# Patient Record
Sex: FEMALE | Race: WHITE | NOT HISPANIC OR LATINO | Employment: OTHER | URBAN - METROPOLITAN AREA
[De-identification: names, ages, dates, MRNs, and addresses within clinical notes are randomized per-mention and may not be internally consistent; named-entity substitution may affect disease eponyms.]

---

## 2023-03-14 LAB — FECAL OCCULT BLD IMMUNOASSAY: NEGATIVE

## 2023-03-17 ENCOUNTER — TELEPHONE (OUTPATIENT)
Dept: PRIMARY CARE | Facility: CLINIC | Age: 73
End: 2023-03-17
Payer: MEDICARE

## 2023-03-17 NOTE — TELEPHONE ENCOUNTER
----- Message from AMRITA Jeffery sent at 3/17/2023  3:01 AM EDT -----    ----- Message -----  From: Miguelito Bucio - Lab Results In  Sent: 3/14/2023   6:19 PM EDT  To: AMRITA Jeffery

## 2023-04-03 ENCOUNTER — TELEPHONE (OUTPATIENT)
Dept: PRIMARY CARE | Facility: CLINIC | Age: 73
End: 2023-04-03
Payer: MEDICARE

## 2023-04-03 RX ORDER — ATORVASTATIN CALCIUM 10 MG/1
1 TABLET, FILM COATED ORAL DAILY
COMMUNITY
Start: 2017-03-03 | End: 2023-04-04 | Stop reason: SDUPTHER

## 2023-04-03 RX ORDER — METOPROLOL TARTRATE 25 MG/1
TABLET, FILM COATED ORAL EVERY 12 HOURS
COMMUNITY
End: 2023-04-04 | Stop reason: SDUPTHER

## 2023-04-03 NOTE — TELEPHONE ENCOUNTER
Patient is requesting a refill on [facility]rol[facility]

## 2023-04-04 DIAGNOSIS — E78.2 MIXED HYPERLIPIDEMIA: Primary | ICD-10-CM

## 2023-04-04 DIAGNOSIS — I10 PRIMARY HYPERTENSION: ICD-10-CM

## 2023-04-04 RX ORDER — ATORVASTATIN CALCIUM 10 MG/1
10 TABLET, FILM COATED ORAL DAILY
Qty: 90 TABLET | Refills: 3 | Status: SHIPPED | OUTPATIENT
Start: 2023-04-04 | End: 2024-04-05 | Stop reason: SDUPTHER

## 2023-04-04 RX ORDER — METOPROLOL TARTRATE 25 MG/1
25 TABLET, FILM COATED ORAL 2 TIMES DAILY
Qty: 180 TABLET | Refills: 3 | Status: SHIPPED | OUTPATIENT
Start: 2023-04-04 | End: 2024-04-05 | Stop reason: SDUPTHER

## 2023-08-25 LAB
ALANINE AMINOTRANSFERASE (SGPT) (U/L) IN SER/PLAS: 26 U/L (ref 7–45)
ALBUMIN (G/DL) IN SER/PLAS: 4.2 G/DL (ref 3.4–5)
ALKALINE PHOSPHATASE (U/L) IN SER/PLAS: 62 U/L (ref 33–136)
ANION GAP IN SER/PLAS: 12 MMOL/L (ref 10–20)
ASPARTATE AMINOTRANSFERASE (SGOT) (U/L) IN SER/PLAS: 18 U/L (ref 9–39)
BASOPHILS (10*3/UL) IN BLOOD BY AUTOMATED COUNT: 0.03 X10E9/L (ref 0–0.1)
BASOPHILS/100 LEUKOCYTES IN BLOOD BY AUTOMATED COUNT: 0.7 % (ref 0–2)
BILIRUBIN TOTAL (MG/DL) IN SER/PLAS: 0.5 MG/DL (ref 0–1.2)
CALCIDIOL (25 OH VITAMIN D3) (NG/ML) IN SER/PLAS: 38 NG/ML
CALCIUM (MG/DL) IN SER/PLAS: 9.1 MG/DL (ref 8.6–10.3)
CARBON DIOXIDE, TOTAL (MMOL/L) IN SER/PLAS: 28 MMOL/L (ref 21–32)
CHLORIDE (MMOL/L) IN SER/PLAS: 107 MMOL/L (ref 98–107)
CHOLESTEROL (MG/DL) IN SER/PLAS: 158 MG/DL (ref 0–199)
CHOLESTEROL IN HDL (MG/DL) IN SER/PLAS: 46.6 MG/DL
CHOLESTEROL/HDL RATIO: 3.4
CREATININE (MG/DL) IN SER/PLAS: 0.67 MG/DL (ref 0.5–1.05)
EOSINOPHILS (10*3/UL) IN BLOOD BY AUTOMATED COUNT: 0.1 X10E9/L (ref 0–0.4)
EOSINOPHILS/100 LEUKOCYTES IN BLOOD BY AUTOMATED COUNT: 2.2 % (ref 0–6)
ERYTHROCYTE DISTRIBUTION WIDTH (RATIO) BY AUTOMATED COUNT: 13 % (ref 11.5–14.5)
ERYTHROCYTE MEAN CORPUSCULAR HEMOGLOBIN CONCENTRATION (G/DL) BY AUTOMATED: 33.3 G/DL (ref 32–36)
ERYTHROCYTE MEAN CORPUSCULAR VOLUME (FL) BY AUTOMATED COUNT: 88 FL (ref 80–100)
ERYTHROCYTES (10*6/UL) IN BLOOD BY AUTOMATED COUNT: 4.81 X10E12/L (ref 4–5.2)
GFR FEMALE: >90 ML/MIN/1.73M2
GLUCOSE (MG/DL) IN SER/PLAS: 113 MG/DL (ref 74–99)
HEMATOCRIT (%) IN BLOOD BY AUTOMATED COUNT: 42.3 % (ref 36–46)
HEMOGLOBIN (G/DL) IN BLOOD: 14.1 G/DL (ref 12–16)
IMMATURE GRANULOCYTES/100 LEUKOCYTES IN BLOOD BY AUTOMATED COUNT: 0 % (ref 0–0.9)
LDL: 83 MG/DL (ref 0–99)
LEUKOCYTES (10*3/UL) IN BLOOD BY AUTOMATED COUNT: 4.5 X10E9/L (ref 4.4–11.3)
LYMPHOCYTES (10*3/UL) IN BLOOD BY AUTOMATED COUNT: 1.93 X10E9/L (ref 0.8–3)
LYMPHOCYTES/100 LEUKOCYTES IN BLOOD BY AUTOMATED COUNT: 43.4 % (ref 13–44)
MONOCYTES (10*3/UL) IN BLOOD BY AUTOMATED COUNT: 0.46 X10E9/L (ref 0.05–0.8)
MONOCYTES/100 LEUKOCYTES IN BLOOD BY AUTOMATED COUNT: 10.3 % (ref 2–10)
NEUTROPHILS (10*3/UL) IN BLOOD BY AUTOMATED COUNT: 1.93 X10E9/L (ref 1.6–5.5)
NEUTROPHILS/100 LEUKOCYTES IN BLOOD BY AUTOMATED COUNT: 43.4 % (ref 40–80)
PLATELETS (10*3/UL) IN BLOOD AUTOMATED COUNT: 249 X10E9/L (ref 150–450)
POTASSIUM (MMOL/L) IN SER/PLAS: 4.1 MMOL/L (ref 3.5–5.3)
PROTEIN TOTAL: 6.5 G/DL (ref 6.4–8.2)
SODIUM (MMOL/L) IN SER/PLAS: 143 MMOL/L (ref 136–145)
THYROTROPIN (MIU/L) IN SER/PLAS BY DETECTION LIMIT <= 0.05 MIU/L: 2.39 MIU/L (ref 0.44–3.98)
TRIGLYCERIDE (MG/DL) IN SER/PLAS: 144 MG/DL (ref 0–149)
UREA NITROGEN (MG/DL) IN SER/PLAS: 18 MG/DL (ref 6–23)
VLDL: 29 MG/DL (ref 0–40)

## 2023-08-26 DIAGNOSIS — R73.09 ELEVATED GLUCOSE: Primary | ICD-10-CM

## 2023-08-26 LAB — PARATHYRIN INTACT (PG/ML) IN SER/PLAS: 51.8 PG/ML (ref 18.5–88)

## 2023-08-29 ENCOUNTER — OFFICE VISIT (OUTPATIENT)
Dept: PRIMARY CARE | Facility: CLINIC | Age: 73
End: 2023-08-29
Payer: MEDICARE

## 2023-08-29 VITALS
WEIGHT: 158 LBS | TEMPERATURE: 97.2 F | DIASTOLIC BLOOD PRESSURE: 72 MMHG | BODY MASS INDEX: 29.08 KG/M2 | SYSTOLIC BLOOD PRESSURE: 132 MMHG | HEIGHT: 62 IN

## 2023-08-29 DIAGNOSIS — I10 BENIGN ESSENTIAL HYPERTENSION: ICD-10-CM

## 2023-08-29 DIAGNOSIS — R73.03 PREDIABETES: Primary | ICD-10-CM

## 2023-08-29 DIAGNOSIS — E78.2 MIXED HYPERLIPIDEMIA: ICD-10-CM

## 2023-08-29 DIAGNOSIS — E89.2 STATUS POST SUBTOTAL PARATHYROIDECTOMY (CMS/HCC): ICD-10-CM

## 2023-08-29 PROBLEM — E78.5 HYPERLIPIDEMIA: Status: ACTIVE | Noted: 2023-08-29

## 2023-08-29 PROBLEM — M54.12 CERVICAL RADICULOPATHY, CHRONIC: Status: ACTIVE | Noted: 2023-08-29

## 2023-08-29 PROBLEM — H43.23 ASTEROID HYALOSIS OF BOTH EYES: Status: ACTIVE | Noted: 2023-08-29

## 2023-08-29 PROBLEM — H43.393 VITREOUS FLOATERS OF BOTH EYES: Status: ACTIVE | Noted: 2023-08-29

## 2023-08-29 PROBLEM — H52.13 MYOPIA OF BOTH EYES WITH ASTIGMATISM AND PRESBYOPIA: Status: ACTIVE | Noted: 2023-08-29

## 2023-08-29 PROBLEM — H43.812 PVD (POSTERIOR VITREOUS DETACHMENT), LEFT EYE: Status: ACTIVE | Noted: 2023-08-29

## 2023-08-29 PROBLEM — D31.32 CHOROIDAL NEVUS OF LEFT EYE: Status: ACTIVE | Noted: 2023-08-29

## 2023-08-29 PROBLEM — H40.1190 PRIMARY OPEN ANGLE GLAUCOMA (POAG): Status: ACTIVE | Noted: 2023-08-29

## 2023-08-29 PROBLEM — H40.2291: Status: ACTIVE | Noted: 2023-08-29

## 2023-08-29 PROBLEM — H34.8310 BRANCH RETINAL VEIN OCCLUSION OF RIGHT EYE WITH MACULAR EDEMA (CMS-HCC): Status: ACTIVE | Noted: 2023-08-29

## 2023-08-29 PROBLEM — Z96.1 PSEUDOPHAKIA OF LEFT EYE: Status: ACTIVE | Noted: 2023-08-29

## 2023-08-29 PROBLEM — H59.032 CYSTOID MACULAR EDEMA FOLLOWING CATARACT SURGERY, LEFT EYE: Status: ACTIVE | Noted: 2023-08-29

## 2023-08-29 PROBLEM — H43.22 ASTEROID HYALOSIS OF LEFT EYE: Status: ACTIVE | Noted: 2023-08-29

## 2023-08-29 PROBLEM — H52.10 MYOPIA: Status: ACTIVE | Noted: 2023-08-29

## 2023-08-29 PROBLEM — H52.4 MYOPIA OF BOTH EYES WITH ASTIGMATISM AND PRESBYOPIA: Status: ACTIVE | Noted: 2023-08-29

## 2023-08-29 PROBLEM — H52.209 ASTIGMATISM: Status: ACTIVE | Noted: 2023-08-29

## 2023-08-29 PROBLEM — E55.9 VITAMIN D DEFICIENCY DISEASE: Status: ACTIVE | Noted: 2023-08-29

## 2023-08-29 PROBLEM — E03.9 PRIMARY HYPOTHYROIDISM: Status: ACTIVE | Noted: 2023-08-29

## 2023-08-29 PROBLEM — I25.10 CAD (CORONARY ARTERY DISEASE): Status: ACTIVE | Noted: 2023-08-29

## 2023-08-29 PROBLEM — H43.822 VITREOMACULAR TRACTION SYNDROME OF LEFT EYE: Status: ACTIVE | Noted: 2023-08-29

## 2023-08-29 PROBLEM — R20.2 PARESTHESIA OF BOTH HANDS: Status: ACTIVE | Noted: 2023-08-29

## 2023-08-29 PROBLEM — H25.811 COMBINED FORM OF AGE-RELATED CATARACT, RIGHT EYE: Status: ACTIVE | Noted: 2023-08-29

## 2023-08-29 PROBLEM — U07.1 COVID-19 VIRUS INFECTION: Status: ACTIVE | Noted: 2023-08-29

## 2023-08-29 PROBLEM — H93.13 TINNITUS OF BOTH EARS: Status: ACTIVE | Noted: 2023-08-29

## 2023-08-29 PROBLEM — D72.829 LEUCOCYTOSIS: Status: ACTIVE | Noted: 2023-08-29

## 2023-08-29 PROBLEM — M54.2 NECK PAIN: Status: ACTIVE | Noted: 2023-08-29

## 2023-08-29 PROBLEM — R91.1 LUNG NODULE: Status: ACTIVE | Noted: 2023-08-29

## 2023-08-29 PROBLEM — H52.203 MYOPIA OF BOTH EYES WITH ASTIGMATISM AND PRESBYOPIA: Status: ACTIVE | Noted: 2023-08-29

## 2023-08-29 PROBLEM — R00.2 PALPITATIONS: Status: ACTIVE | Noted: 2023-08-29

## 2023-08-29 PROBLEM — H25.13 CATARACT, NUCLEAR SCLEROTIC, BOTH EYES: Status: ACTIVE | Noted: 2023-08-29

## 2023-08-29 PROBLEM — H26.9 CATARACT, CORTICAL, BOTH EYES: Status: ACTIVE | Noted: 2023-08-29

## 2023-08-29 PROBLEM — M85.80 OSTEOPENIA: Status: ACTIVE | Noted: 2023-08-29

## 2023-08-29 LAB — POC HEMOGLOBIN A1C: 5.9 % (ref 4.2–6.5)

## 2023-08-29 PROCEDURE — 1157F ADVNC CARE PLAN IN RCRD: CPT | Performed by: NURSE PRACTITIONER

## 2023-08-29 PROCEDURE — 83036 HEMOGLOBIN GLYCOSYLATED A1C: CPT | Performed by: NURSE PRACTITIONER

## 2023-08-29 PROCEDURE — 1036F TOBACCO NON-USER: CPT | Performed by: NURSE PRACTITIONER

## 2023-08-29 PROCEDURE — 99214 OFFICE O/P EST MOD 30 MIN: CPT | Performed by: NURSE PRACTITIONER

## 2023-08-29 PROCEDURE — 3078F DIAST BP <80 MM HG: CPT | Performed by: NURSE PRACTITIONER

## 2023-08-29 PROCEDURE — 1160F RVW MEDS BY RX/DR IN RCRD: CPT | Performed by: NURSE PRACTITIONER

## 2023-08-29 PROCEDURE — 3075F SYST BP GE 130 - 139MM HG: CPT | Performed by: NURSE PRACTITIONER

## 2023-08-29 PROCEDURE — 1159F MED LIST DOCD IN RCRD: CPT | Performed by: NURSE PRACTITIONER

## 2023-08-29 RX ORDER — BRIMONIDINE TARTRATE 2 MG/ML
1 SOLUTION/ DROPS OPHTHALMIC
COMMUNITY
Start: 2022-10-28 | End: 2024-01-18 | Stop reason: SDUPTHER

## 2023-08-29 RX ORDER — LATANOPROST 50 UG/ML
1 SOLUTION/ DROPS OPHTHALMIC DAILY
COMMUNITY
Start: 2023-08-26 | End: 2024-01-10 | Stop reason: SDUPTHER

## 2023-08-29 RX ORDER — ACETAMINOPHEN 500 MG
1 TABLET ORAL DAILY
COMMUNITY

## 2023-08-29 RX ORDER — VIT C/E/ZN/COPPR/LUTEIN/ZEAXAN 250MG-90MG
CAPSULE ORAL 2 TIMES DAILY
COMMUNITY

## 2023-08-29 RX ORDER — TALC
1 POWDER (GRAM) TOPICAL DAILY
COMMUNITY

## 2023-08-29 RX ORDER — TIMOLOL MALEATE 5 MG/ML
1 SOLUTION/ DROPS OPHTHALMIC DAILY
COMMUNITY
Start: 2022-05-04 | End: 2023-10-30

## 2023-08-29 RX ORDER — GLUCOSAMINE/CHONDR SU A SOD 167-133 MG
1 CAPSULE ORAL DAILY
COMMUNITY

## 2023-08-29 RX ORDER — CALCIUM CARBONATE/VITAMIN D3 600MG-5MCG
1 TABLET ORAL DAILY
COMMUNITY

## 2023-08-29 ASSESSMENT — PATIENT HEALTH QUESTIONNAIRE - PHQ9
2. FEELING DOWN, DEPRESSED OR HOPELESS: NOT AT ALL
SUM OF ALL RESPONSES TO PHQ9 QUESTIONS 1 AND 2: 0
1. LITTLE INTEREST OR PLEASURE IN DOING THINGS: NOT AT ALL

## 2023-08-29 NOTE — PROGRESS NOTES
Subjective   Patient ID: Es Ivey is a 73 y.o. female who presents for Follow-up (Review labs).       Recent Results (from the past 1008 hour(s))   Vitamin D, Total    Collection Time: 08/25/23 10:01 AM   Result Value Ref Range    Vitamin D, 25-Hydroxy 38 ng/mL   Comprehensive Metabolic Panel    Collection Time: 08/25/23 10:01 AM   Result Value Ref Range    Glucose 113 (H) 74 - 99 mg/dL    Sodium 143 136 - 145 mmol/L    Potassium 4.1 3.5 - 5.3 mmol/L    Chloride 107 98 - 107 mmol/L    Bicarbonate 28 21 - 32 mmol/L    Anion Gap 12 10 - 20 mmol/L    Urea Nitrogen 18 6 - 23 mg/dL    Creatinine 0.67 0.50 - 1.05 mg/dL    GFR Female >90 >90 mL/min/1.73m2    Calcium 9.1 8.6 - 10.3 mg/dL    Albumin 4.2 3.4 - 5.0 g/dL    Alkaline Phosphatase 62 33 - 136 U/L    Total Protein 6.5 6.4 - 8.2 g/dL    AST 18 9 - 39 U/L    Total Bilirubin 0.5 0.0 - 1.2 mg/dL    ALT (SGPT) 26 7 - 45 U/L   CBC and Auto Differential    Collection Time: 08/25/23 10:01 AM   Result Value Ref Range    WBC 4.5 4.4 - 11.3 x10E9/L    RBC 4.81 4.00 - 5.20 x10E12/L    Hemoglobin 14.1 12.0 - 16.0 g/dL    Hematocrit 42.3 36.0 - 46.0 %    MCV 88 80 - 100 fL    MCHC 33.3 32.0 - 36.0 g/dL    Platelets 249 150 - 450 x10E9/L    RDW 13.0 11.5 - 14.5 %    Neutrophils % 43.4 40.0 - 80.0 %    Immature Granulocytes %, Automated 0.0 0.0 - 0.9 %    Lymphocytes % 43.4 13.0 - 44.0 %    Monocytes % 10.3 2.0 - 10.0 %    Eosinophils % 2.2 0.0 - 6.0 %    Basophils % 0.7 0.0 - 2.0 %    Neutrophils Absolute 1.93 1.60 - 5.50 x10E9/L    Lymphocytes Absolute 1.93 0.80 - 3.00 x10E9/L    Monocytes Absolute 0.46 0.05 - 0.80 x10E9/L    Eosinophils Absolute 0.10 0.00 - 0.40 x10E9/L    Basophils Absolute 0.03 0.00 - 0.10 x10E9/L   TSH with reflex to Free T4 if abnormal    Collection Time: 08/25/23 10:01 AM   Result Value Ref Range    TSH 2.39 0.44 - 3.98 mIU/L   Lipid Panel    Collection Time: 08/25/23 10:01 AM   Result Value Ref Range    Cholesterol 158 0 - 199 mg/dL    HDL 46.6 mg/dL  "   Cholesterol/HDL Ratio 3.4     LDL 83 0 - 99 mg/dL    VLDL 29 0 - 40 mg/dL    Triglycerides 144 0 - 149 mg/dL   Parathyroid Hormone, Intact    Collection Time: 08/25/23 10:01 AM   Result Value Ref Range    PTH 51.8 18.5 - 88.0 pg/mL           HPI    Nightmares stopped and no additional trouble sleeping.  Some trouble getting back to sleep at about 5 when up to the bathroom.  Walking regularly:  2 miles briskly 3-4 times per week  Bored when doing this.  2 miles takes about 21-22 minutes per mile.  No issues with musculoskeletal issues.    Neck is a problem  Had to stop cycling due to symptoms in hands  Once per week, does picnic lunch in park    Dr. Hunter, Endocrine once per year  Then only going at the time of the bone density.    Would prefer not to go.    Feeling good day to day.  Denies any side effects to medications.  Checking BP at home - see list.  Generally to goal.    Review of Systems   All other systems reviewed and are negative.    Objective   /72   Temp 36.2 °C (97.2 °F)   Ht 1.568 m (5' 1.75\")   Wt 71.7 kg (158 lb)   BMI 29.13 kg/m²     Physical Exam  Vitals and nursing note reviewed.   Constitutional:       Appearance: Normal appearance.   HENT:      Head: Normocephalic and atraumatic.      Right Ear: Tympanic membrane, ear canal and external ear normal.      Left Ear: Tympanic membrane and ear canal normal.   Neck:      Thyroid: No thyroid mass, thyromegaly or thyroid tenderness.   Cardiovascular:      Rate and Rhythm: Normal rate and regular rhythm.      Pulses: Normal pulses.      Heart sounds: Normal heart sounds.   Pulmonary:      Effort: Pulmonary effort is normal.      Breath sounds: Normal breath sounds.   Abdominal:      General: Bowel sounds are normal.      Palpations: Abdomen is soft.   Skin:     General: Skin is warm.   Neurological:      Mental Status: She is alert and oriented to person, place, and time. Mental status is at baseline.   Psychiatric:         Mood and Affect: " Mood normal.         Behavior: Behavior normal.         Assessment/Plan   Problem List Items Addressed This Visit       Benign essential hypertension    Hyperlipidemia    Prediabetes - Primary    Relevant Orders    Hemoglobin A1C    Glucose, fasting    POCT glycosylated hemoglobin (Hb A1C) manually resulted (Completed)    Status post subtotal parathyroidectomy (CMS/[facility])     Talked about diet and activity  Pt to cut back on refined sugars       Wanda Mistry, GROVER-CNP

## 2023-08-29 NOTE — PATIENT INSTRUCTIONS
Good to see you today.    The numbers look good.  Plan to follow up with fasting labs before visit in March.  Let me know if you need anything.

## 2023-08-30 PROBLEM — R73.03 PREDIABETES: Status: ACTIVE | Noted: 2023-08-30

## 2023-09-27 ENCOUNTER — TELEPHONE (OUTPATIENT)
Dept: PRIMARY CARE | Facility: CLINIC | Age: 73
End: 2023-09-27
Payer: MEDICARE

## 2023-10-30 DIAGNOSIS — H40.1132 PRIMARY OPEN ANGLE GLAUCOMA (POAG) OF BOTH EYES, MODERATE STAGE: Primary | ICD-10-CM

## 2023-10-30 RX ORDER — TIMOLOL MALEATE 5 MG/ML
1 SOLUTION/ DROPS OPHTHALMIC DAILY
Qty: 30 ML | Refills: 0 | Status: SHIPPED | OUTPATIENT
Start: 2023-10-30 | End: 2024-01-18 | Stop reason: SDUPTHER

## 2023-12-21 ENCOUNTER — APPOINTMENT (OUTPATIENT)
Dept: OPHTHALMOLOGY | Facility: CLINIC | Age: 73
End: 2023-12-21
Payer: MEDICARE

## 2024-01-10 DIAGNOSIS — H40.1132 PRIMARY OPEN-ANGLE GLAUCOMA, BILATERAL, MODERATE STAGE: Primary | ICD-10-CM

## 2024-01-10 DIAGNOSIS — H40.1132 PRIMARY OPEN-ANGLE GLAUCOMA, BILATERAL, MODERATE STAGE: ICD-10-CM

## 2024-01-10 RX ORDER — LATANOPROST 50 UG/ML
1 SOLUTION/ DROPS OPHTHALMIC DAILY
Qty: 2.5 ML | Refills: 11 | Status: SHIPPED | OUTPATIENT
Start: 2024-01-10 | End: 2024-01-10 | Stop reason: SDUPTHER

## 2024-01-10 RX ORDER — LATANOPROST 50 UG/ML
1 SOLUTION/ DROPS OPHTHALMIC DAILY
Qty: 2.5 ML | Refills: 11 | Status: SHIPPED | OUTPATIENT
Start: 2024-01-10 | End: 2025-01-09

## 2024-01-18 ENCOUNTER — OFFICE VISIT (OUTPATIENT)
Dept: OPHTHALMOLOGY | Facility: CLINIC | Age: 74
End: 2024-01-18
Payer: MEDICARE

## 2024-01-18 DIAGNOSIS — Z01.00 EXAMINATION OF EYES AND VISION: ICD-10-CM

## 2024-01-18 DIAGNOSIS — H53.40 UNSPECIFIED VISUAL FIELD DEFECTS: ICD-10-CM

## 2024-01-18 DIAGNOSIS — H40.1132 PRIMARY OPEN ANGLE GLAUCOMA (POAG) OF BOTH EYES, MODERATE STAGE: Primary | ICD-10-CM

## 2024-01-18 DIAGNOSIS — H53.2 DIPLOPIA: ICD-10-CM

## 2024-01-18 PROCEDURE — 99214 OFFICE O/P EST MOD 30 MIN: CPT | Performed by: OPHTHALMOLOGY

## 2024-01-18 RX ORDER — TIMOLOL MALEATE 5 MG/ML
1 SOLUTION/ DROPS OPHTHALMIC DAILY
Qty: 10 ML | Refills: 11 | Status: SHIPPED | OUTPATIENT
Start: 2024-01-18 | End: 2025-01-17

## 2024-01-18 RX ORDER — BRIMONIDINE TARTRATE 2 MG/ML
1 SOLUTION/ DROPS OPHTHALMIC 2 TIMES DAILY
Qty: 10 ML | Refills: 11 | Status: SHIPPED | OUTPATIENT
Start: 2024-01-18 | End: 2025-01-17

## 2024-01-18 ASSESSMENT — REFRACTION_WEARINGRX
OS_CYLINDER: -0.50
OD_CYLINDER: -1.25
OS_SPHERE: -2.00
OD_SPHERE: -3.75
OS_ADD: +2.50
OS_AXIS: 030
OD_AXIS: 075
OD_ADD: +2.50

## 2024-01-18 ASSESSMENT — SLIT LAMP EXAM - LIDS
COMMENTS: GOOD POSITION
COMMENTS: GOOD POSITION

## 2024-01-18 ASSESSMENT — CONF VISUAL FIELD
OS_INFERIOR_NASAL_RESTRICTION: 0
OS_NORMAL: 1
OS_SUPERIOR_NASAL_RESTRICTION: 0
OS_SUPERIOR_TEMPORAL_RESTRICTION: 0
OS_INFERIOR_TEMPORAL_RESTRICTION: 0

## 2024-01-18 ASSESSMENT — TONOMETRY
OS_IOP_MMHG: 12
IOP_METHOD: GOLDMANN APPLANATION
OD_IOP_MMHG: 14

## 2024-01-18 ASSESSMENT — ENCOUNTER SYMPTOMS: EYES NEGATIVE: 1

## 2024-01-18 ASSESSMENT — PACHYMETRY
OD_CT(UM): 591
OS_CT(UM): 602

## 2024-01-18 ASSESSMENT — REFRACTION_MANIFEST
OD_CYLINDER: -1.25
OS_AXIS: 030
OS_SPHERE: -2.00
OD_ADD: +2.50
OD_AXIS: 090
OS_ADD: +2.50
OS_CYLINDER: -0.50
OD_SPHERE: -3.75

## 2024-01-18 ASSESSMENT — VISUAL ACUITY
OS_CC: 20/30
METHOD: SNELLEN - LINEAR
OD_BAT_MED: 20/40-1
OD_CC: 20/40

## 2024-01-18 ASSESSMENT — CUP TO DISC RATIO
OS_RATIO: 0.5
OD_RATIO: 0.9

## 2024-01-18 ASSESSMENT — EXTERNAL EXAM - RIGHT EYE: OD_EXAM: NORMAL

## 2024-01-18 ASSESSMENT — EXTERNAL EXAM - LEFT EYE: OS_EXAM: NORMAL

## 2024-01-18 NOTE — PROGRESS NOTES
"Visual Acuity (Snellen - Linear)         Right Left    Dist cc 20/40 20/30          Tonometry       Tonometry (Goldmann Applanation, 8:17 AM)         Right Left    Pressure 14 12                  Assessment/Plan   Last dilated:  8/24/23  color plates:  10/10 OU    1.  Primary Open-Angle Glaucoma OU:  /600.  Tm 20/22 (immediately post-op OS).  Agree with Dr. Aceves that IOP needs to be better controlled.  Today, IOP is very well controlled OS, but borderline to acceptable OD      Plan:  cont timolol OU Qam             cont brimonidine 0.2% OU BID             cont latanoprost OU QHS             f/u 4 months (glare testing OD)    2.  Cataract OD / Pseudophakia (PCIOL) OS:  s/p YAG Cap OS.  Pt inquired about cataract surgery.  Pt would likely benefit from cataract + MIGS.  h/o CME following cataract extraction OS.  Pt with asymmetric glaucoma with adv VF loss OD and early VF changes  OS.  Discussed that the areas of \"smuge\" would not go away after cataract surgery I.e. glaucoma defects - showed her on her visual fields.  She reports issues with glare OU even OS - given that there is no PCO OS, her glare is likely non-physiologic and personal preference.  She is also unhappy with reading distance OS and clarity - explained non-physiologic and limitation of current technology.  Explained that neither of these would improve with cataract surgery either.  Pt almost qualifies for cataract surgery, but not yet there.      Plan:  MRx as per Dr. Aceves    "

## 2024-03-01 ENCOUNTER — LAB (OUTPATIENT)
Dept: LAB | Facility: LAB | Age: 74
End: 2024-03-01
Payer: MEDICARE

## 2024-03-01 DIAGNOSIS — R73.03 PREDIABETES: ICD-10-CM

## 2024-03-01 LAB
EST. AVERAGE GLUCOSE BLD GHB EST-MCNC: 120 MG/DL
GLUCOSE P FAST SERPL-MCNC: 105 MG/DL (ref 74–99)
HBA1C MFR BLD: 5.8 %

## 2024-03-01 PROCEDURE — 82947 ASSAY GLUCOSE BLOOD QUANT: CPT

## 2024-03-01 PROCEDURE — 83036 HEMOGLOBIN GLYCOSYLATED A1C: CPT

## 2024-03-01 PROCEDURE — 36415 COLL VENOUS BLD VENIPUNCTURE: CPT

## 2024-03-05 PROBLEM — H43.399 VITREOUS FLOATERS: Status: ACTIVE | Noted: 2022-05-04

## 2024-03-05 RX ORDER — BRIMONIDINE TARTRATE AND TIMOLOL MALEATE 2; 5 MG/ML; MG/ML
SOLUTION OPHTHALMIC
COMMUNITY
Start: 2022-10-06

## 2024-03-06 ENCOUNTER — OFFICE VISIT (OUTPATIENT)
Dept: PRIMARY CARE | Facility: CLINIC | Age: 74
End: 2024-03-06
Payer: MEDICARE

## 2024-03-06 VITALS
SYSTOLIC BLOOD PRESSURE: 138 MMHG | DIASTOLIC BLOOD PRESSURE: 82 MMHG | HEIGHT: 62 IN | WEIGHT: 147 LBS | BODY MASS INDEX: 27.05 KG/M2 | TEMPERATURE: 97.3 F

## 2024-03-06 DIAGNOSIS — Z00.00 ROUTINE GENERAL MEDICAL EXAMINATION AT A HEALTH CARE FACILITY: Primary | ICD-10-CM

## 2024-03-06 DIAGNOSIS — Z12.11 COLON CANCER SCREENING: ICD-10-CM

## 2024-03-06 PROCEDURE — 1159F MED LIST DOCD IN RCRD: CPT | Performed by: NURSE PRACTITIONER

## 2024-03-06 PROCEDURE — 3079F DIAST BP 80-89 MM HG: CPT | Performed by: NURSE PRACTITIONER

## 2024-03-06 PROCEDURE — 1157F ADVNC CARE PLAN IN RCRD: CPT | Performed by: NURSE PRACTITIONER

## 2024-03-06 PROCEDURE — 1160F RVW MEDS BY RX/DR IN RCRD: CPT | Performed by: NURSE PRACTITIONER

## 2024-03-06 PROCEDURE — 1158F ADVNC CARE PLAN TLK DOCD: CPT | Performed by: NURSE PRACTITIONER

## 2024-03-06 PROCEDURE — G0439 PPPS, SUBSEQ VISIT: HCPCS | Performed by: NURSE PRACTITIONER

## 2024-03-06 PROCEDURE — 1170F FXNL STATUS ASSESSED: CPT | Performed by: NURSE PRACTITIONER

## 2024-03-06 PROCEDURE — 1036F TOBACCO NON-USER: CPT | Performed by: NURSE PRACTITIONER

## 2024-03-06 PROCEDURE — 3075F SYST BP GE 130 - 139MM HG: CPT | Performed by: NURSE PRACTITIONER

## 2024-03-06 PROCEDURE — 1123F ACP DISCUSS/DSCN MKR DOCD: CPT | Performed by: NURSE PRACTITIONER

## 2024-03-06 ASSESSMENT — ACTIVITIES OF DAILY LIVING (ADL)
MANAGING_FINANCES: INDEPENDENT
GROCERY_SHOPPING: INDEPENDENT
DRESSING: INDEPENDENT
BATHING: INDEPENDENT
TAKING_MEDICATION: INDEPENDENT
DOING_HOUSEWORK: INDEPENDENT

## 2024-03-06 ASSESSMENT — PATIENT HEALTH QUESTIONNAIRE - PHQ9
2. FEELING DOWN, DEPRESSED OR HOPELESS: NOT AT ALL
1. LITTLE INTEREST OR PLEASURE IN DOING THINGS: NOT AT ALL
SUM OF ALL RESPONSES TO PHQ9 QUESTIONS 1 AND 2: 0
2. FEELING DOWN, DEPRESSED OR HOPELESS: NOT AT ALL

## 2024-03-06 NOTE — PATIENT INSTRUCTIONS
Good to see you today.   Mammogram and DEXA in [place]  Please  FIT test from lab  Keep up the great work walking regularly.  Let me know about your heart rate.

## 2024-03-06 NOTE — PROGRESS NOTES
"Subjective   Patient ID: Es Ivey is a 73 y.o. female who presents for Medicare Annual Wellness Visit Subsequent.    HPI   OPHTH:  Leticia, and Kur[employer]  Glaucoma Dx by Barney  In October cut out some processed foods.  Protein bars and better diet.   Has lost weight and feels good!      Review of Systems   All other systems reviewed and are negative.          Objective   /82   Temp 36.3 °C (97.3 °F)   Ht 1.562 m (5' 1.5\")   Wt 66.7 kg (147 lb)   BMI 27.33 kg/m²   Weight down by 11# per our scale    Physical Exam  Vitals and nursing note reviewed.   Constitutional:       Appearance: Normal appearance.   HENT:      Head: Normocephalic and atraumatic.      Right Ear: Tympanic membrane, ear canal and external ear normal.      Left Ear: Tympanic membrane, ear canal and external ear normal.      Nose: Nose normal.      Mouth/Throat:      Mouth: Mucous membranes are moist.      Pharynx: Oropharynx is clear.   Eyes:      Extraocular Movements: Extraocular movements intact.      Conjunctiva/sclera: Conjunctivae normal.      Pupils: Pupils are equal, round, and reactive to light.   Neck:      Thyroid: No thyroid mass, thyromegaly or thyroid tenderness.   Cardiovascular:      Rate and Rhythm: Normal rate and regular rhythm.      Pulses: Normal pulses.      Heart sounds: Normal heart sounds.   Pulmonary:      Effort: Pulmonary effort is normal.      Breath sounds: Normal breath sounds.   Abdominal:      General: Bowel sounds are normal.      Palpations: Abdomen is soft.   Genitourinary:     Comments: Deferred  Musculoskeletal:         General: Normal range of motion.      Cervical back: Normal range of motion and neck supple.   Skin:     General: Skin is warm.      Capillary Refill: Capillary refill takes 2 to 3 seconds.   Neurological:      Mental Status: She is alert and oriented to person, place, and time. Mental status is at baseline.   Psychiatric:         Mood and Affect: Mood normal.         Behavior: " Behavior normal.         Thought Content: Thought content normal.         Judgment: Judgment normal.         Assessment/Plan   Diagnoses and all orders for this visit:  Routine general medical examination at a health care facility  Colon cancer screening  -     Fecal Occult Blood Immunoassy; Future

## 2024-03-11 ENCOUNTER — OFFICE VISIT (OUTPATIENT)
Dept: OPHTHALMOLOGY | Facility: CLINIC | Age: 74
End: 2024-03-11
Payer: MEDICARE

## 2024-03-11 DIAGNOSIS — H43.822 VITREOMACULAR TRACTION SYNDROME OF LEFT EYE: ICD-10-CM

## 2024-03-11 DIAGNOSIS — H43.22 ASTEROID HYALOSIS OF LEFT EYE: ICD-10-CM

## 2024-03-11 DIAGNOSIS — Z12.11 COLON CANCER SCREENING: ICD-10-CM

## 2024-03-11 DIAGNOSIS — H43.23 ASTEROID HYALOSIS OF BOTH EYES: ICD-10-CM

## 2024-03-11 DIAGNOSIS — H34.8310 BRANCH RETINAL VEIN OCCLUSION OF RIGHT EYE WITH MACULAR EDEMA (CMS-HCC): Primary | ICD-10-CM

## 2024-03-11 DIAGNOSIS — H43.812 PVD (POSTERIOR VITREOUS DETACHMENT), LEFT EYE: ICD-10-CM

## 2024-03-11 PROCEDURE — 92134 CPTRZ OPH DX IMG PST SGM RTA: CPT | Performed by: OPHTHALMOLOGY

## 2024-03-11 PROCEDURE — 82274 ASSAY TEST FOR BLOOD FECAL: CPT | Performed by: NURSE PRACTITIONER

## 2024-03-11 PROCEDURE — 99214 OFFICE O/P EST MOD 30 MIN: CPT | Performed by: OPHTHALMOLOGY

## 2024-03-11 PROCEDURE — 1036F TOBACCO NON-USER: CPT | Performed by: OPHTHALMOLOGY

## 2024-03-11 PROCEDURE — 1159F MED LIST DOCD IN RCRD: CPT | Performed by: OPHTHALMOLOGY

## 2024-03-11 PROCEDURE — 1157F ADVNC CARE PLAN IN RCRD: CPT | Performed by: OPHTHALMOLOGY

## 2024-03-11 PROCEDURE — 1160F RVW MEDS BY RX/DR IN RCRD: CPT | Performed by: OPHTHALMOLOGY

## 2024-03-11 ASSESSMENT — CUP TO DISC RATIO
OD_RATIO: 0.9
OS_RATIO: 0.5

## 2024-03-11 ASSESSMENT — TONOMETRY
OD_IOP_MMHG: 7
OS_IOP_MMHG: 8
IOP_METHOD: GOLDMANN APPLANATION

## 2024-03-11 ASSESSMENT — ENCOUNTER SYMPTOMS
CARDIOVASCULAR NEGATIVE: 0
RESPIRATORY NEGATIVE: 0
ALLERGIC/IMMUNOLOGIC NEGATIVE: 0
PSYCHIATRIC NEGATIVE: 0
NEUROLOGICAL NEGATIVE: 0
EYES NEGATIVE: 1
ENDOCRINE NEGATIVE: 0
MUSCULOSKELETAL NEGATIVE: 0
CONSTITUTIONAL NEGATIVE: 0
GASTROINTESTINAL NEGATIVE: 0
HEMATOLOGIC/LYMPHATIC NEGATIVE: 0

## 2024-03-11 ASSESSMENT — EXTERNAL EXAM - RIGHT EYE: OD_EXAM: NORMAL

## 2024-03-11 ASSESSMENT — VISUAL ACUITY
OD_CC: 20/40
OD_CC+: -2
OS_CC: 20/30
CORRECTION_TYPE: GLASSES
OS_CC+: -1
METHOD: SNELLEN - LINEAR

## 2024-03-11 ASSESSMENT — SLIT LAMP EXAM - LIDS
COMMENTS: GOOD POSITION
COMMENTS: GOOD POSITION

## 2024-03-11 ASSESSMENT — PACHYMETRY
OS_CT(UM): 602
OD_CT(UM): 591

## 2024-03-11 ASSESSMENT — EXTERNAL EXAM - LEFT EYE: OS_EXAM: NORMAL

## 2024-03-11 NOTE — PROGRESS NOTES
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 Infections, discomfort form sutures and hemorrhage as well as retinal detachment were explained. The biggest risk of  surgery failing in a retinal detachment are due to challenges in surgery or late scar formation described as proliferative vitreoretinopathy. In a membrane peel there can be  along period for recovery of visual acuity, in more long standing membrane,s takes longer to resolve retinal abnormalities      4m

## 2024-03-18 ENCOUNTER — APPOINTMENT (OUTPATIENT)
Dept: OPHTHALMOLOGY | Facility: CLINIC | Age: 74
End: 2024-03-18
Payer: MEDICARE

## 2024-03-19 LAB — HEMOCCULT STL QL IA: NEGATIVE

## 2024-03-24 PROBLEM — Z12.11 COLON CANCER SCREENING: Status: ACTIVE | Noted: 2024-03-24

## 2024-03-24 PROBLEM — Z00.00 ROUTINE GENERAL MEDICAL EXAMINATION AT A HEALTH CARE FACILITY: Status: ACTIVE | Noted: 2024-03-24

## 2024-04-05 DIAGNOSIS — E78.2 MIXED HYPERLIPIDEMIA: ICD-10-CM

## 2024-04-05 DIAGNOSIS — I10 PRIMARY HYPERTENSION: ICD-10-CM

## 2024-04-05 RX ORDER — METOPROLOL TARTRATE 25 MG/1
25 TABLET, FILM COATED ORAL 2 TIMES DAILY
Qty: 180 TABLET | Refills: 3 | Status: SHIPPED | OUTPATIENT
Start: 2024-04-05

## 2024-04-05 RX ORDER — ATORVASTATIN CALCIUM 10 MG/1
10 TABLET, FILM COATED ORAL DAILY
Qty: 90 TABLET | Refills: 3 | Status: SHIPPED | OUTPATIENT
Start: 2024-04-05

## 2024-06-13 ENCOUNTER — APPOINTMENT (OUTPATIENT)
Dept: OPHTHALMOLOGY | Facility: CLINIC | Age: 74
End: 2024-06-13
Payer: MEDICARE

## 2024-06-13 DIAGNOSIS — H40.1132 PRIMARY OPEN ANGLE GLAUCOMA (POAG) OF BOTH EYES, MODERATE STAGE: Primary | ICD-10-CM

## 2024-06-13 DIAGNOSIS — H34.8310 BRANCH RETINAL VEIN OCCLUSION OF RIGHT EYE WITH MACULAR EDEMA (CMS-HCC): ICD-10-CM

## 2024-06-13 PROCEDURE — 92133 CPTRZD OPH DX IMG PST SGM ON: CPT | Performed by: OPHTHALMOLOGY

## 2024-06-13 PROCEDURE — 99213 OFFICE O/P EST LOW 20 MIN: CPT | Performed by: OPHTHALMOLOGY

## 2024-06-13 ASSESSMENT — TONOMETRY
IOP_METHOD: GOLDMANN APPLANATION
OS_IOP_MMHG: 9
OD_IOP_MMHG: 9

## 2024-06-13 ASSESSMENT — REFRACTION_WEARINGRX
OS_SPHERE: -2.00
OS_ADD: +2.50
OS_CYLINDER: -0.50
OD_SPHERE: -3.75
OD_ADD: +2.50
OD_CYLINDER: -1.25
OS_AXIS: 030
OD_AXIS: 075

## 2024-06-13 ASSESSMENT — ENCOUNTER SYMPTOMS
ENDOCRINE NEGATIVE: 0
EYES NEGATIVE: 1
RESPIRATORY NEGATIVE: 0
PSYCHIATRIC NEGATIVE: 0
MUSCULOSKELETAL NEGATIVE: 0
CONSTITUTIONAL NEGATIVE: 0
NEUROLOGICAL NEGATIVE: 0
CARDIOVASCULAR NEGATIVE: 0
ALLERGIC/IMMUNOLOGIC NEGATIVE: 0
GASTROINTESTINAL NEGATIVE: 0
HEMATOLOGIC/LYMPHATIC NEGATIVE: 0

## 2024-06-13 ASSESSMENT — EXTERNAL EXAM - LEFT EYE: OS_EXAM: NORMAL

## 2024-06-13 ASSESSMENT — VISUAL ACUITY
METHOD: SNELLEN - LINEAR
OD_BAT_MED: 20/
OD_CC: 20/50
OS_CC: 20/30

## 2024-06-13 ASSESSMENT — SLIT LAMP EXAM - LIDS
COMMENTS: GOOD POSITION
COMMENTS: GOOD POSITION

## 2024-06-13 ASSESSMENT — PACHYMETRY
OD_CT(UM): 591
OS_CT(UM): 602

## 2024-06-13 ASSESSMENT — CUP TO DISC RATIO
OS_RATIO: 0.5
OD_RATIO: 0.9

## 2024-06-13 ASSESSMENT — EXTERNAL EXAM - RIGHT EYE: OD_EXAM: NORMAL

## 2024-06-13 NOTE — PROGRESS NOTES
"Visual Acuity (Snellen - Linear)         Right Left    Dist cc 20/50 20/30          Tonometry       Tonometry (Goldmann Applanation, 9:01 AM)         Right Left    Pressure 9 9                  Assessment/Plan   Last dilated:  8/24/23  color plates:  10/10 OU    1.  Primary Open-Angle Glaucoma OU:  /600.  Tm 20/22 (immediately post-op OS).  Agree with Dr. Aceves that IOP needs to be better controlled.  Today, IOP is very well controlled OS, but borderline to acceptable OD      Plan:  cont timolol OU Qam             cont brimonidine 0.2% OU BID             cont latanoprost OU QHS             f/u 3 months (HVF, dilation, lenstar)     2.  Cataract OD / Pseudophakia (PCIOL) OS:  s/p YAG Cap OS.  Pt inquired about cataract surgery.  Pt would likely benefit from cataract + MIGS.  h/o CME following cataract extraction OS.  Pt with asymmetric glaucoma with adv VF loss OD and early VF changes  OS.  Discussed that the areas of \"smuge\" would not go away after cataract surgery I.e. glaucoma defects - showed her on her visual fields.  She reports issues with glare OU even OS - given that there is no PCO OS, her glare is likely non-physiologic and personal preference.  She is also unhappy with reading distance OS and clarity - explained non-physiologic and limitation of current technology.  Explained that neither of these would improve with cataract surgery either.  Pt qualifies for cataract surgery, but she is not yet ready to proceed.      Plan:  monitor  "

## 2024-07-22 ENCOUNTER — APPOINTMENT (OUTPATIENT)
Dept: OPHTHALMOLOGY | Facility: CLINIC | Age: 74
End: 2024-07-22
Payer: MEDICARE

## 2024-07-22 DIAGNOSIS — H34.8310 BRANCH RETINAL VEIN OCCLUSION OF RIGHT EYE WITH MACULAR EDEMA (CMS-HCC): ICD-10-CM

## 2024-07-22 DIAGNOSIS — H59.032 CYSTOID MACULAR EDEMA FOLLOWING CATARACT SURGERY, LEFT EYE: ICD-10-CM

## 2024-07-22 DIAGNOSIS — H43.822 VITREOMACULAR TRACTION SYNDROME OF LEFT EYE: Primary | ICD-10-CM

## 2024-07-22 PROCEDURE — 99213 OFFICE O/P EST LOW 20 MIN: CPT | Performed by: OPHTHALMOLOGY

## 2024-07-22 PROCEDURE — 92134 CPTRZ OPH DX IMG PST SGM RTA: CPT | Performed by: OPHTHALMOLOGY

## 2024-07-22 ASSESSMENT — CONF VISUAL FIELD
OD_INFERIOR_TEMPORAL_RESTRICTION: 0
OS_NORMAL: 1
OD_NORMAL: 1
OD_INFERIOR_NASAL_RESTRICTION: 0
OS_INFERIOR_NASAL_RESTRICTION: 0
OS_SUPERIOR_TEMPORAL_RESTRICTION: 0
OS_SUPERIOR_NASAL_RESTRICTION: 0
OD_SUPERIOR_TEMPORAL_RESTRICTION: 0
OS_INFERIOR_TEMPORAL_RESTRICTION: 0
OD_SUPERIOR_NASAL_RESTRICTION: 0

## 2024-07-22 ASSESSMENT — TONOMETRY
OD_IOP_MMHG: 11
OS_IOP_MMHG: 13
IOP_METHOD: TONOPEN

## 2024-07-22 ASSESSMENT — PACHYMETRY
OD_CT(UM): 591
OS_CT(UM): 602

## 2024-07-22 ASSESSMENT — ENCOUNTER SYMPTOMS: EYES NEGATIVE: 1

## 2024-07-22 ASSESSMENT — VISUAL ACUITY
OD_CC: 20/40-1
METHOD: SNELLEN - LINEAR
OS_CC: 20/40
CORRECTION_TYPE: GLASSES

## 2024-07-22 ASSESSMENT — EXTERNAL EXAM - RIGHT EYE: OD_EXAM: NORMAL

## 2024-07-22 ASSESSMENT — EXTERNAL EXAM - LEFT EYE: OS_EXAM: NORMAL

## 2024-07-22 ASSESSMENT — SLIT LAMP EXAM - LIDS
COMMENTS: GOOD POSITION
COMMENTS: GOOD POSITION

## 2024-07-22 ASSESSMENT — CUP TO DISC RATIO
OD_RATIO: 0.9
OS_RATIO: 0.5

## 2024-07-22 NOTE — PROGRESS NOTES
Assessment/Plan   Diagnoses and all orders for this visit:  Vitreomacular traction syndrome of left eye  -     OCT, Retina - OU - Both Eyes  Branch retinal vein occlusion of right eye with macular edema (CMS-HCC)  Cystoid macular edema following cataract surgery, left eye          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  9 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 Infections, discomfort form sutures and hemorrhage as well as retinal detachment were explained. The biggest risk of  surgery failing in a retinal detachment are due to challenges in surgery or late scar formation described as proliferative vitreoretinopathy. In a membrane peel there can be  along period for recovery of visual acuity, in more long standing membrane,s takes longer to resolve retinal abnormalities    She may not need pars plana vitrectomy (PPV) if visual acuity (VA) improves enough  right  But she does need  intravitreal anti VEGf for branch retinal vein occlusion (BRVO) cystoid macular edema (CME) right eye  Before intraocular lens (IOL) right

## 2024-08-30 ENCOUNTER — LAB (OUTPATIENT)
Dept: LAB | Facility: LAB | Age: 74
End: 2024-08-30
Payer: MEDICARE

## 2024-08-30 DIAGNOSIS — E89.2 STATUS POST SUBTOTAL PARATHYROIDECTOMY (CMS/HCC): ICD-10-CM

## 2024-08-30 DIAGNOSIS — R73.03 PREDIABETES: Primary | ICD-10-CM

## 2024-08-30 DIAGNOSIS — E78.2 MIXED HYPERLIPIDEMIA: ICD-10-CM

## 2024-08-30 DIAGNOSIS — I10 BENIGN ESSENTIAL HYPERTENSION: ICD-10-CM

## 2024-08-30 DIAGNOSIS — E55.9 VITAMIN D DEFICIENCY: ICD-10-CM

## 2024-08-30 DIAGNOSIS — R73.03 PREDIABETES: ICD-10-CM

## 2024-08-30 LAB
25(OH)D3 SERPL-MCNC: 42 NG/ML (ref 30–100)
ALBUMIN SERPL BCP-MCNC: 4.2 G/DL (ref 3.4–5)
ALP SERPL-CCNC: 52 U/L (ref 33–136)
ALT SERPL W P-5'-P-CCNC: 19 U/L (ref 7–45)
ANION GAP SERPL CALC-SCNC: 10 MMOL/L (ref 10–20)
AST SERPL W P-5'-P-CCNC: 16 U/L (ref 9–39)
BASOPHILS # BLD AUTO: 0.02 X10*3/UL (ref 0–0.1)
BASOPHILS NFR BLD AUTO: 0.4 %
BILIRUB SERPL-MCNC: 0.6 MG/DL (ref 0–1.2)
BUN SERPL-MCNC: 20 MG/DL (ref 6–23)
CALCIUM SERPL-MCNC: 8.1 MG/DL (ref 8.6–10.3)
CHLORIDE SERPL-SCNC: 107 MMOL/L (ref 98–107)
CHOLEST SERPL-MCNC: 151 MG/DL (ref 0–199)
CHOLESTEROL/HDL RATIO: 3
CO2 SERPL-SCNC: 28 MMOL/L (ref 21–32)
CREAT SERPL-MCNC: 0.61 MG/DL (ref 0.5–1.05)
EGFRCR SERPLBLD CKD-EPI 2021: >90 ML/MIN/1.73M*2
EOSINOPHIL # BLD AUTO: 0.09 X10*3/UL (ref 0–0.4)
EOSINOPHIL NFR BLD AUTO: 1.9 %
ERYTHROCYTE [DISTWIDTH] IN BLOOD BY AUTOMATED COUNT: 12.8 % (ref 11.5–14.5)
GLUCOSE SERPL-MCNC: 108 MG/DL (ref 74–99)
HCT VFR BLD AUTO: 41.7 % (ref 36–46)
HDLC SERPL-MCNC: 50.8 MG/DL
HGB BLD-MCNC: 13.9 G/DL (ref 12–16)
IMM GRANULOCYTES # BLD AUTO: 0.01 X10*3/UL (ref 0–0.5)
IMM GRANULOCYTES NFR BLD AUTO: 0.2 % (ref 0–0.9)
LDLC SERPL CALC-MCNC: 80 MG/DL
LYMPHOCYTES # BLD AUTO: 1.84 X10*3/UL (ref 0.8–3)
LYMPHOCYTES NFR BLD AUTO: 39.1 %
MCH RBC QN AUTO: 29.6 PG (ref 26–34)
MCHC RBC AUTO-ENTMCNC: 33.3 G/DL (ref 32–36)
MCV RBC AUTO: 89 FL (ref 80–100)
MONOCYTES # BLD AUTO: 0.57 X10*3/UL (ref 0.05–0.8)
MONOCYTES NFR BLD AUTO: 12.1 %
NEUTROPHILS # BLD AUTO: 2.17 X10*3/UL (ref 1.6–5.5)
NEUTROPHILS NFR BLD AUTO: 46.3 %
NON HDL CHOLESTEROL: 100 MG/DL (ref 0–149)
NRBC BLD-RTO: 0.4 /100 WBCS (ref 0–0)
PLATELET # BLD AUTO: 227 X10*3/UL (ref 150–450)
POTASSIUM SERPL-SCNC: 3.9 MMOL/L (ref 3.5–5.3)
PROT SERPL-MCNC: 6.4 G/DL (ref 6.4–8.2)
RBC # BLD AUTO: 4.69 X10*6/UL (ref 4–5.2)
SODIUM SERPL-SCNC: 141 MMOL/L (ref 136–145)
TRIGL SERPL-MCNC: 100 MG/DL (ref 0–149)
TSH SERPL-ACNC: 3.2 MIU/L (ref 0.44–3.98)
VLDL: 20 MG/DL (ref 0–40)
WBC # BLD AUTO: 4.7 X10*3/UL (ref 4.4–11.3)

## 2024-08-30 PROCEDURE — 84443 ASSAY THYROID STIM HORMONE: CPT

## 2024-08-30 PROCEDURE — 83036 HEMOGLOBIN GLYCOSYLATED A1C: CPT

## 2024-08-30 PROCEDURE — 80053 COMPREHEN METABOLIC PANEL: CPT

## 2024-08-30 PROCEDURE — 85025 COMPLETE CBC W/AUTO DIFF WBC: CPT

## 2024-08-30 PROCEDURE — 82306 VITAMIN D 25 HYDROXY: CPT

## 2024-08-30 PROCEDURE — 83970 ASSAY OF PARATHORMONE: CPT

## 2024-08-30 PROCEDURE — 80061 LIPID PANEL: CPT

## 2024-08-30 PROCEDURE — 36415 COLL VENOUS BLD VENIPUNCTURE: CPT

## 2024-08-31 LAB
EST. AVERAGE GLUCOSE BLD GHB EST-MCNC: 123 MG/DL
HBA1C MFR BLD: 5.9 %
PTH-INTACT SERPL-MCNC: 53.8 PG/ML (ref 18.5–88)

## 2024-09-04 ENCOUNTER — LAB (OUTPATIENT)
Dept: LAB | Facility: LAB | Age: 74
End: 2024-09-04
Payer: MEDICARE

## 2024-09-04 ENCOUNTER — APPOINTMENT (OUTPATIENT)
Dept: PRIMARY CARE | Facility: CLINIC | Age: 74
End: 2024-09-04
Payer: MEDICARE

## 2024-09-04 VITALS
BODY MASS INDEX: 26.87 KG/M2 | SYSTOLIC BLOOD PRESSURE: 120 MMHG | HEART RATE: 73 BPM | DIASTOLIC BLOOD PRESSURE: 76 MMHG | OXYGEN SATURATION: 94 % | HEIGHT: 62 IN | WEIGHT: 146 LBS | TEMPERATURE: 97.5 F

## 2024-09-04 DIAGNOSIS — R79.89 ABNORMAL CBC: ICD-10-CM

## 2024-09-04 DIAGNOSIS — H53.9 VISUAL DISTURBANCE: ICD-10-CM

## 2024-09-04 DIAGNOSIS — Z78.0 POSTMENOPAUSAL: ICD-10-CM

## 2024-09-04 DIAGNOSIS — H40.1132 PRIMARY OPEN ANGLE GLAUCOMA (POAG) OF BOTH EYES, MODERATE STAGE: ICD-10-CM

## 2024-09-04 DIAGNOSIS — E03.9 PRIMARY HYPOTHYROIDISM: ICD-10-CM

## 2024-09-04 DIAGNOSIS — Z12.31 ENCOUNTER FOR SCREENING MAMMOGRAM FOR MALIGNANT NEOPLASM OF BREAST: Primary | ICD-10-CM

## 2024-09-04 DIAGNOSIS — E89.2 STATUS POST SUBTOTAL PARATHYROIDECTOMY (CMS/HCC): ICD-10-CM

## 2024-09-04 PROBLEM — Z12.11 COLON CANCER SCREENING: Status: RESOLVED | Noted: 2024-03-24 | Resolved: 2024-09-04

## 2024-09-04 PROBLEM — U07.1 COVID-19 VIRUS INFECTION: Status: RESOLVED | Noted: 2023-08-29 | Resolved: 2024-09-04

## 2024-09-04 PROBLEM — D72.829 LEUCOCYTOSIS: Status: RESOLVED | Noted: 2023-08-29 | Resolved: 2024-09-04

## 2024-09-04 LAB
BASOPHILS # BLD AUTO: 0.03 X10*3/UL (ref 0–0.1)
BASOPHILS NFR BLD AUTO: 0.6 %
EOSINOPHIL # BLD AUTO: 0.08 X10*3/UL (ref 0–0.4)
EOSINOPHIL NFR BLD AUTO: 1.7 %
ERYTHROCYTE [DISTWIDTH] IN BLOOD BY AUTOMATED COUNT: 12.8 % (ref 11.5–14.5)
HCT VFR BLD AUTO: 41.7 % (ref 36–46)
HGB BLD-MCNC: 13.8 G/DL (ref 12–16)
IMM GRANULOCYTES # BLD AUTO: 0.01 X10*3/UL (ref 0–0.5)
IMM GRANULOCYTES NFR BLD AUTO: 0.2 % (ref 0–0.9)
LYMPHOCYTES # BLD AUTO: 1.69 X10*3/UL (ref 0.8–3)
LYMPHOCYTES NFR BLD AUTO: 35.3 %
MCH RBC QN AUTO: 29.8 PG (ref 26–34)
MCHC RBC AUTO-ENTMCNC: 33.1 G/DL (ref 32–36)
MCV RBC AUTO: 90 FL (ref 80–100)
MONOCYTES # BLD AUTO: 0.5 X10*3/UL (ref 0.05–0.8)
MONOCYTES NFR BLD AUTO: 10.4 %
NEUTROPHILS # BLD AUTO: 2.48 X10*3/UL (ref 1.6–5.5)
NEUTROPHILS NFR BLD AUTO: 51.8 %
NRBC BLD-RTO: 0 /100 WBCS (ref 0–0)
PLATELET # BLD AUTO: 229 X10*3/UL (ref 150–450)
RBC # BLD AUTO: 4.63 X10*6/UL (ref 4–5.2)
WBC # BLD AUTO: 4.8 X10*3/UL (ref 4.4–11.3)

## 2024-09-04 PROCEDURE — 1160F RVW MEDS BY RX/DR IN RCRD: CPT | Performed by: NURSE PRACTITIONER

## 2024-09-04 PROCEDURE — 1157F ADVNC CARE PLAN IN RCRD: CPT | Performed by: NURSE PRACTITIONER

## 2024-09-04 PROCEDURE — 85025 COMPLETE CBC W/AUTO DIFF WBC: CPT

## 2024-09-04 PROCEDURE — 36415 COLL VENOUS BLD VENIPUNCTURE: CPT

## 2024-09-04 PROCEDURE — 99214 OFFICE O/P EST MOD 30 MIN: CPT | Performed by: NURSE PRACTITIONER

## 2024-09-04 PROCEDURE — 3078F DIAST BP <80 MM HG: CPT | Performed by: NURSE PRACTITIONER

## 2024-09-04 PROCEDURE — 1159F MED LIST DOCD IN RCRD: CPT | Performed by: NURSE PRACTITIONER

## 2024-09-04 PROCEDURE — 3074F SYST BP LT 130 MM HG: CPT | Performed by: NURSE PRACTITIONER

## 2024-09-04 PROCEDURE — 3008F BODY MASS INDEX DOCD: CPT | Performed by: NURSE PRACTITIONER

## 2024-09-04 RX ORDER — TIMOLOL MALEATE 5 MG/ML
1 SOLUTION/ DROPS OPHTHALMIC DAILY
Qty: 10 ML | Refills: 11 | Status: SHIPPED | OUTPATIENT
Start: 2024-09-04 | End: 2025-09-04

## 2024-09-04 ASSESSMENT — PATIENT HEALTH QUESTIONNAIRE - PHQ9
1. LITTLE INTEREST OR PLEASURE IN DOING THINGS: NOT AT ALL
SUM OF ALL RESPONSES TO PHQ9 QUESTIONS 1 AND 2: 0
2. FEELING DOWN, DEPRESSED OR HOPELESS: NOT AT ALL

## 2024-09-04 NOTE — PATIENT INSTRUCTIONS
Good to see you today.  DEXA and Mammogram  ordered  Please repeat the CBC today.  Plan on 6 month follow up with Fasting labs before.

## 2024-09-04 NOTE — PROGRESS NOTES
"Subjective   Patient ID: Es Ivey is a 74 y.o. female who presents for Follow-up (Review labs).    HPI     Still working with OPHTH  and is frustrated   To have glaucoma surgery in right eye and cataract removal.    Has been active with walking regularly.  9/26 Mammogram  Due for bone density  Current with colon screening 3/9/23  willing to do cologuard  Review of Systems   Constitutional:  Positive for activity change.   Eyes:  Positive for visual disturbance.   All other systems reviewed and are negative.      Objective   /76   Pulse 73   Temp 36.4 °C (97.5 °F)   Ht 1.562 m (5' 1.5\")   Wt 66.2 kg (146 lb)   SpO2 94%   BMI 27.14 kg/m²     Physical Exam  Vitals and nursing note reviewed.   Constitutional:       Appearance: She is obese.   HENT:      Head: Normocephalic and atraumatic.      Right Ear: Tympanic membrane, ear canal and external ear normal.      Left Ear: Tympanic membrane, ear canal and external ear normal.      Mouth/Throat:      Pharynx: Oropharynx is clear.   Cardiovascular:      Rate and Rhythm: Normal rate and regular rhythm.      Pulses: Normal pulses.      Heart sounds: Normal heart sounds.   Pulmonary:      Effort: Pulmonary effort is normal.      Breath sounds: Normal breath sounds.   Abdominal:      General: Bowel sounds are normal.      Palpations: Abdomen is soft.   Neurological:      Mental Status: She is alert and oriented to person, place, and time.   Psychiatric:         Mood and Affect: Mood normal.         Behavior: Behavior normal.         Thought Content: Thought content normal.         Judgment: Judgment normal.         Assessment/Plan   Assessment & Plan  Encounter for screening mammogram for malignant neoplasm of breast    Orders:    BI mammo bilateral screening tomosynthesis; Future    Abnormal CBC  Repeat today    Orders:    CBC and Auto Differential; Future    Status post subtotal parathyroidectomy (CMS/HCC)         Postmenopausal    Orders:    XR DEXA bone " density; Future    Visual disturbance         Primary hypothyroidism

## 2024-09-09 DIAGNOSIS — L23.7 ALLERGIC DERMATITIS DUE TO POISON IVY: Primary | ICD-10-CM

## 2024-09-09 RX ORDER — DIAPER,BRIEF,INFANT-TODD,DISP
EACH MISCELLANEOUS 2 TIMES DAILY
Qty: 56 G | Refills: 1 | Status: SHIPPED | OUTPATIENT
Start: 2024-09-09

## 2024-09-12 ENCOUNTER — APPOINTMENT (OUTPATIENT)
Dept: OPHTHALMOLOGY | Facility: CLINIC | Age: 74
End: 2024-09-12
Payer: MEDICARE

## 2024-09-12 DIAGNOSIS — H40.1132 PRIMARY OPEN ANGLE GLAUCOMA (POAG) OF BOTH EYES, MODERATE STAGE: Primary | ICD-10-CM

## 2024-09-12 DIAGNOSIS — H25.811 COMBINED FORMS OF AGE-RELATED CATARACT OF RIGHT EYE: ICD-10-CM

## 2024-09-12 DIAGNOSIS — H40.1130 PRIMARY OPEN ANGLE GLAUCOMA OF BOTH EYES, UNSPECIFIED GLAUCOMA STAGE: ICD-10-CM

## 2024-09-12 DIAGNOSIS — H25.813 COMBINED FORMS OF AGE-RELATED CATARACT OF BOTH EYES: ICD-10-CM

## 2024-09-12 DIAGNOSIS — H40.1112 PRIMARY OPEN ANGLE GLAUCOMA OF RIGHT EYE, MODERATE STAGE: ICD-10-CM

## 2024-09-12 LAB
A LENGTH (OD): 25.95
A LENGTH (OS): 25.78
AC IOL (OD): 3.73
AC IOL (OS): 4.54
WHITE TO WHITE (OD): 12.33 MM
WHITE TO WHITE (OS): 12.24 MM

## 2024-09-12 PROCEDURE — 1157F ADVNC CARE PLAN IN RCRD: CPT | Performed by: OPHTHALMOLOGY

## 2024-09-12 PROCEDURE — 92083 EXTENDED VISUAL FIELD XM: CPT | Performed by: OPHTHALMOLOGY

## 2024-09-12 PROCEDURE — 99214 OFFICE O/P EST MOD 30 MIN: CPT | Performed by: OPHTHALMOLOGY

## 2024-09-12 PROCEDURE — 92136 OPHTHALMIC BIOMETRY: CPT | Mod: BILATERAL PROCEDURE | Performed by: OPHTHALMOLOGY

## 2024-09-12 RX ORDER — PHENYLEPHRINE HYDROCHLORIDE 25 MG/ML
1 SOLUTION/ DROPS OPHTHALMIC
OUTPATIENT
Start: 2024-09-12 | End: 2024-09-12

## 2024-09-12 RX ORDER — TETRACAINE HYDROCHLORIDE 5 MG/ML
1 SOLUTION OPHTHALMIC ONCE
OUTPATIENT
Start: 2024-09-12 | End: 2024-09-12

## 2024-09-12 RX ORDER — MOXIFLOXACIN 5 MG/ML
1 SOLUTION/ DROPS OPHTHALMIC
OUTPATIENT
Start: 2024-09-12 | End: 2024-09-12

## 2024-09-12 RX ORDER — TROPICAMIDE 10 MG/ML
1 SOLUTION/ DROPS OPHTHALMIC
OUTPATIENT
Start: 2024-09-12 | End: 2024-09-12

## 2024-09-12 RX ORDER — DICLOFENAC SODIUM 1 MG/ML
1 SOLUTION/ DROPS OPHTHALMIC ONCE
OUTPATIENT
Start: 2024-09-12 | End: 2024-09-12

## 2024-09-12 RX ORDER — SODIUM CHLORIDE, SODIUM LACTATE, POTASSIUM CHLORIDE, CALCIUM CHLORIDE 600; 310; 30; 20 MG/100ML; MG/100ML; MG/100ML; MG/100ML
20 INJECTION, SOLUTION INTRAVENOUS CONTINUOUS
OUTPATIENT
Start: 2024-09-12

## 2024-09-12 RX ORDER — CYCLOPENTOLATE HYDROCHLORIDE 10 MG/ML
1 SOLUTION/ DROPS OPHTHALMIC
OUTPATIENT
Start: 2024-09-12 | End: 2024-09-12

## 2024-09-12 ASSESSMENT — CONF VISUAL FIELD
OS_NORMAL: 1
OD_NORMAL: 1
OD_INFERIOR_NASAL_RESTRICTION: 0
OD_INFERIOR_TEMPORAL_RESTRICTION: 0
OD_SUPERIOR_NASAL_RESTRICTION: 0
OS_INFERIOR_NASAL_RESTRICTION: 0
OS_SUPERIOR_NASAL_RESTRICTION: 0
OS_SUPERIOR_TEMPORAL_RESTRICTION: 0
OS_INFERIOR_TEMPORAL_RESTRICTION: 0
OD_SUPERIOR_TEMPORAL_RESTRICTION: 0

## 2024-09-12 ASSESSMENT — SLIT LAMP EXAM - LIDS
COMMENTS: GOOD POSITION
COMMENTS: GOOD POSITION

## 2024-09-12 ASSESSMENT — VISUAL ACUITY
CORRECTION_TYPE: GLASSES
OS_CC+: +2
METHOD: SNELLEN - LINEAR
OD_CC+: +2
OS_CC: 20/30
OD_CC: 20/50

## 2024-09-12 ASSESSMENT — EXTERNAL EXAM - LEFT EYE: OS_EXAM: NORMAL

## 2024-09-12 ASSESSMENT — TONOMETRY
OS_IOP_MMHG: 10
OD_IOP_MMHG: 11
IOP_METHOD: GOLDMANN APPLANATION

## 2024-09-12 ASSESSMENT — REFRACTION_WEARINGRX
OS_SPHERE: -2.00
OD_SPHERE: -3.75
OD_CYLINDER: -1.25
OS_CYLINDER: -0.50
OD_ADD: +2.50
OS_AXIS: 030
OS_ADD: +2.50
OD_AXIS: 075

## 2024-09-12 ASSESSMENT — CUP TO DISC RATIO
OD_RATIO: 0.9
OS_RATIO: 0.5

## 2024-09-12 ASSESSMENT — EXTERNAL EXAM - RIGHT EYE: OD_EXAM: NORMAL

## 2024-09-12 ASSESSMENT — PACHYMETRY
OS_CT(UM): 602
OD_CT(UM): 591

## 2024-09-12 NOTE — PROGRESS NOTES
"Visual Acuity (Snellen - Linear)         Right Left    Dist cc 20/50 +2 20/30 +2      Correction: Glasses          Tonometry       Tonometry (Goldmann Applanation, 8:44 AM)         Right Left    Pressure 11 10                  Assessment/Plan   Last dilated:  9/12/24  color plates:  10/10 OU    1.  Primary Open-Angle Glaucoma OU:  /600.  Tm 20/22 (immediately post-op OS).  Agree with Dr. Aceves that IOP needs to be better controlled.  Today, IOP is very well controlled OS, but borderline to acceptable OD      Plan:  cont timolol OU Qam             cont brimonidine 0.2% OU BID             cont latanoprost OU QHS             f/u 3 months (HVF, dilation, lenstar)     2.  Cataract OD / Pseudophakia (PCIOL) OS:  s/p YAG Cap OS.  Pt inquired about cataract surgery.  Pt would likely benefit from cataract + MIGS.  h/o CME following cataract extraction OS.  Pt with asymmetric glaucoma with adv VF loss OD and early VF changes  OS.  Discussed that the areas of \"smuge\" would not go away after cataract surgery I.e. glaucoma defects - showed her on her visual fields.  She reports issues with glare OU even OS - given that there is no PCO OS, her glare is likely non-physiologic and personal preference.  She is also unhappy with reading distance OS and clarity - explained non-physiologic and limitation of current technology.  Explained that neither of these would improve with cataract surgery either.   Detailed discussion on options 1) CPM, 2) cataract extraction c IOL +/- goniotomy Risks, benefits, and alternatives reviewed.  Risks including, but not limited to, death, loss of vision, increased or decreased eye pressure, pain, retinal detachment, suprachoroidal hemorrage, swelling in the cornea or retina/choroid, infection, double vision, need for further surgery, etc. As a result of cataract extraction, it is believed that the patient will experience improved vision.  Discussed IOL options 1) stnd, 2) toric, 3) " multifocal/accomm. Pt with 1.31 D of astigmatism.  I do not feel that accommodating IOLs work well and multifocals can degrade contrast sensitivity.  Pt chooses standard IOL.        Plan:  pt wishes to proceed with cataract extraction with IOL + goniotomy OD (RIGHT)

## 2024-09-26 ENCOUNTER — HOSPITAL ENCOUNTER (OUTPATIENT)
Dept: RADIOLOGY | Facility: CLINIC | Age: 74
Discharge: HOME | End: 2024-09-26
Payer: MEDICARE

## 2024-09-26 VITALS — BODY MASS INDEX: 27.56 KG/M2 | WEIGHT: 146 LBS | HEIGHT: 61 IN

## 2024-09-26 DIAGNOSIS — Z78.0 POSTMENOPAUSAL: ICD-10-CM

## 2024-09-26 DIAGNOSIS — Z12.31 ENCOUNTER FOR SCREENING MAMMOGRAM FOR MALIGNANT NEOPLASM OF BREAST: ICD-10-CM

## 2024-09-26 PROCEDURE — 77080 DXA BONE DENSITY AXIAL: CPT

## 2024-09-26 PROCEDURE — 77067 SCR MAMMO BI INCL CAD: CPT

## 2024-09-30 PROBLEM — Z12.31 ENCOUNTER FOR SCREENING MAMMOGRAM FOR MALIGNANT NEOPLASM OF BREAST: Status: ACTIVE | Noted: 2024-03-24

## 2024-09-30 PROBLEM — H53.9 VISUAL DISTURBANCE: Status: ACTIVE | Noted: 2024-09-30

## 2024-09-30 PROBLEM — R79.89 ABNORMAL CBC: Status: ACTIVE | Noted: 2024-09-30

## 2024-09-30 PROBLEM — Z78.0 POSTMENOPAUSAL: Status: ACTIVE | Noted: 2024-09-30

## 2024-09-30 ASSESSMENT — ENCOUNTER SYMPTOMS: ACTIVITY CHANGE: 1

## 2024-10-07 ENCOUNTER — APPOINTMENT (OUTPATIENT)
Dept: OPHTHALMOLOGY | Facility: CLINIC | Age: 74
End: 2024-10-07
Payer: MEDICARE

## 2024-10-07 DIAGNOSIS — H34.8110 CENTRAL RETINAL VEIN OCCLUSION WITH MACULAR EDEMA OF RIGHT EYE: Primary | ICD-10-CM

## 2024-10-07 DIAGNOSIS — H43.822 VITREOMACULAR TRACTION SYNDROME OF LEFT EYE: ICD-10-CM

## 2024-10-07 DIAGNOSIS — H30.22 POSTERIOR CYCLITIS OF LEFT EYE: ICD-10-CM

## 2024-10-07 DIAGNOSIS — H34.8310 BRANCH RETINAL VEIN OCCLUSION OF RIGHT EYE WITH MACULAR EDEMA: ICD-10-CM

## 2024-10-07 PROCEDURE — 1036F TOBACCO NON-USER: CPT | Performed by: OPHTHALMOLOGY

## 2024-10-07 PROCEDURE — 1159F MED LIST DOCD IN RCRD: CPT | Performed by: OPHTHALMOLOGY

## 2024-10-07 PROCEDURE — 1157F ADVNC CARE PLAN IN RCRD: CPT | Performed by: OPHTHALMOLOGY

## 2024-10-07 PROCEDURE — 92134 CPTRZ OPH DX IMG PST SGM RTA: CPT | Mod: BILATERAL PROCEDURE | Performed by: OPHTHALMOLOGY

## 2024-10-07 PROCEDURE — 67028 INJECTION EYE DRUG: CPT | Mod: LEFT SIDE | Performed by: OPHTHALMOLOGY

## 2024-10-07 ASSESSMENT — CONF VISUAL FIELD
OD_INFERIOR_NASAL_RESTRICTION: 0
OS_NORMAL: 1
OD_SUPERIOR_NASAL_RESTRICTION: 0
OS_SUPERIOR_TEMPORAL_RESTRICTION: 0
OD_SUPERIOR_TEMPORAL_RESTRICTION: 0
OD_INFERIOR_TEMPORAL_RESTRICTION: 0
OS_SUPERIOR_NASAL_RESTRICTION: 0
OS_INFERIOR_NASAL_RESTRICTION: 0
OS_INFERIOR_TEMPORAL_RESTRICTION: 0
OD_NORMAL: 1

## 2024-10-07 ASSESSMENT — SLIT LAMP EXAM - LIDS
COMMENTS: GOOD POSITION
COMMENTS: GOOD POSITION

## 2024-10-07 ASSESSMENT — PACHYMETRY
OD_CT(UM): 591
OS_CT(UM): 602

## 2024-10-07 ASSESSMENT — CUP TO DISC RATIO
OS_RATIO: 0.5
OD_RATIO: 0.9

## 2024-10-07 ASSESSMENT — TONOMETRY
OS_IOP_MMHG: 10
IOP_METHOD: GOLDMANN APPLANATION
OD_IOP_MMHG: 11

## 2024-10-07 ASSESSMENT — VISUAL ACUITY
OD_CC: 20/50
OS_CC: 20/30
METHOD: SNELLEN - LINEAR

## 2024-10-07 ASSESSMENT — EXTERNAL EXAM - LEFT EYE: OS_EXAM: NORMAL

## 2024-10-07 ASSESSMENT — EXTERNAL EXAM - RIGHT EYE: OD_EXAM: NORMAL

## 2024-10-07 ASSESSMENT — ENCOUNTER SYMPTOMS: EYES NEGATIVE: 1

## 2024-10-07 NOTE — PROGRESS NOTES
Assessment/Plan   Diagnoses and all orders for this visit:  Vitreomacular traction syndrome of left eye  -     OCT, Retina - OU - Both Eyes  Branch retinal vein occlusion of right eye with macular edema  -     OCT, Retina - OU - Both Eyes          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 Recommend anti-VEGF injections. Avastin done OD today in a sterile manner with Betadine 5% for antisepsis      I suspect she has vitreous opacification with NS 2 compounding it  The shadow she sees is more of a haze    Considr intraocular lens (IOL) right eye when reaady then we can cosnider pars plana vitrectomy (PPV) is warranted    The risks and benefits of vitreoretinal surgery was explained clearly.Risks include loss of vision even permanently. Infections, discomfort form sutures and hemorrhage as well as retinal detachment were explained. The biggest risk of  surgery failing in a retinal detachment are due to challenges in surgery or late scar formation described as proliferative  vitreoretinopathy. In a membrane peel there can be  along period for recovery of visual acuity, in more long standing membrane,s takes longer to resolve retinal abnormalities    She may not need pars plana vitrectomy (PPV) if visual acuity (VA) improves enough  right  But she does need intravitreal anti VEGf for branch retinal vein occlusion (BRVO) cystoid macular edema (CME) right eye  Before intraocular lens (IOL) right

## 2024-10-09 DIAGNOSIS — H25.811 COMBINED FORMS OF AGE-RELATED CATARACT OF RIGHT EYE: ICD-10-CM

## 2024-10-09 RX ORDER — OFLOXACIN 3 MG/ML
1 SOLUTION/ DROPS OPHTHALMIC 4 TIMES DAILY
Qty: 5 ML | Refills: 0 | Status: SHIPPED | OUTPATIENT
Start: 2024-10-09 | End: 2024-10-23

## 2024-10-09 RX ORDER — KETOROLAC TROMETHAMINE 4 MG/ML
1 SOLUTION/ DROPS OPHTHALMIC 4 TIMES DAILY
Qty: 7.5 ML | Refills: 0 | Status: SHIPPED | OUTPATIENT
Start: 2024-10-09 | End: 2024-10-23

## 2024-10-09 RX ORDER — PREDNISOLONE ACETATE 10 MG/ML
1 SUSPENSION/ DROPS OPHTHALMIC 4 TIMES DAILY
Qty: 5 ML | Refills: 1 | Status: SHIPPED | OUTPATIENT
Start: 2024-10-09 | End: 2024-11-08

## 2024-10-09 RX ORDER — CIPROFLOXACIN HYDROCHLORIDE 500 MG/1
1.25 TABLET ORAL
Status: COMPLETED | OUTPATIENT
Start: 2024-10-07 | End: 2024-10-07

## 2024-10-14 ENCOUNTER — ANESTHESIA EVENT (OUTPATIENT)
Dept: OPERATING ROOM | Facility: CLINIC | Age: 74
End: 2024-10-14
Payer: MEDICARE

## 2024-10-15 ENCOUNTER — ANESTHESIA (OUTPATIENT)
Dept: OPERATING ROOM | Facility: CLINIC | Age: 74
End: 2024-10-15
Payer: MEDICARE

## 2024-10-15 ENCOUNTER — HOSPITAL ENCOUNTER (OUTPATIENT)
Facility: CLINIC | Age: 74
Setting detail: OUTPATIENT SURGERY
Discharge: HOME | End: 2024-10-15
Attending: OPHTHALMOLOGY | Admitting: OPHTHALMOLOGY
Payer: MEDICARE

## 2024-10-15 VITALS
SYSTOLIC BLOOD PRESSURE: 142 MMHG | BODY MASS INDEX: 27.38 KG/M2 | TEMPERATURE: 97.2 F | DIASTOLIC BLOOD PRESSURE: 65 MMHG | HEIGHT: 62 IN | HEART RATE: 62 BPM | WEIGHT: 148.81 LBS | OXYGEN SATURATION: 97 % | RESPIRATION RATE: 16 BRPM

## 2024-10-15 DIAGNOSIS — H40.1112 PRIMARY OPEN ANGLE GLAUCOMA OF RIGHT EYE, MODERATE STAGE: ICD-10-CM

## 2024-10-15 DIAGNOSIS — H40.1132 PRIMARY OPEN ANGLE GLAUCOMA (POAG) OF BOTH EYES, MODERATE STAGE: Primary | ICD-10-CM

## 2024-10-15 DIAGNOSIS — H25.811 COMBINED FORMS OF AGE-RELATED CATARACT OF RIGHT EYE: ICD-10-CM

## 2024-10-15 PROCEDURE — 2500000001 HC RX 250 WO HCPCS SELF ADMINISTERED DRUGS (ALT 637 FOR MEDICARE OP): Performed by: OPHTHALMOLOGY

## 2024-10-15 PROCEDURE — 2500000004 HC RX 250 GENERAL PHARMACY W/ HCPCS (ALT 636 FOR OP/ED)

## 2024-10-15 PROCEDURE — 7100000010 HC PHASE TWO TIME - EACH INCREMENTAL 1 MINUTE: Performed by: OPHTHALMOLOGY

## 2024-10-15 PROCEDURE — 7100000001 HC RECOVERY ROOM TIME - INITIAL BASE CHARGE: Performed by: OPHTHALMOLOGY

## 2024-10-15 PROCEDURE — 3600000003 HC OR TIME - INITIAL BASE CHARGE - PROCEDURE LEVEL THREE: Performed by: OPHTHALMOLOGY

## 2024-10-15 PROCEDURE — 2500000004 HC RX 250 GENERAL PHARMACY W/ HCPCS (ALT 636 FOR OP/ED): Performed by: OPHTHALMOLOGY

## 2024-10-15 PROCEDURE — 2500000005 HC RX 250 GENERAL PHARMACY W/O HCPCS: Performed by: OPHTHALMOLOGY

## 2024-10-15 PROCEDURE — 2720000007 HC OR 272 NO HCPCS: Performed by: OPHTHALMOLOGY

## 2024-10-15 PROCEDURE — 2760000001 HC OR 276 NO HCPCS: Performed by: OPHTHALMOLOGY

## 2024-10-15 PROCEDURE — C1780 LENS, INTRAOCULAR (NEW TECH): HCPCS | Performed by: OPHTHALMOLOGY

## 2024-10-15 PROCEDURE — 2500000005 HC RX 250 GENERAL PHARMACY W/O HCPCS

## 2024-10-15 PROCEDURE — 3700000002 HC GENERAL ANESTHESIA TIME - EACH INCREMENTAL 1 MINUTE: Performed by: OPHTHALMOLOGY

## 2024-10-15 PROCEDURE — 7100000002 HC RECOVERY ROOM TIME - EACH INCREMENTAL 1 MINUTE: Performed by: OPHTHALMOLOGY

## 2024-10-15 PROCEDURE — 3700000001 HC GENERAL ANESTHESIA TIME - INITIAL BASE CHARGE: Performed by: OPHTHALMOLOGY

## 2024-10-15 PROCEDURE — 3600000008 HC OR TIME - EACH INCREMENTAL 1 MINUTE - PROCEDURE LEVEL THREE: Performed by: OPHTHALMOLOGY

## 2024-10-15 PROCEDURE — 7100000009 HC PHASE TWO TIME - INITIAL BASE CHARGE: Performed by: OPHTHALMOLOGY

## 2024-10-15 PROCEDURE — 66984 XCAPSL CTRC RMVL W/O ECP: CPT | Performed by: OPHTHALMOLOGY

## 2024-10-15 PROCEDURE — 65820 GONIOTOMY: CPT | Performed by: OPHTHALMOLOGY

## 2024-10-15 DEVICE — ACRYSOF(R) IQ ASPHERIC IOL SP ACRYLIC FOLDABLELENS WULTRASERT(TM) DELIVERY SYSTEM UV WBLUE LIGHT FILTER. 13.0MM LENGTH 6.0MM ANTERIORASYMMETRIC BICONVEX OPTIC PLANAR HAPTICS.
Type: IMPLANTABLE DEVICE | Site: EYE | Status: FUNCTIONAL
Brand: ACRYSOF ULTRASERT

## 2024-10-15 RX ORDER — ACETAMINOPHEN 325 MG/1
650 TABLET ORAL EVERY 4 HOURS PRN
Status: DISCONTINUED | OUTPATIENT
Start: 2024-10-15 | End: 2024-10-15 | Stop reason: HOSPADM

## 2024-10-15 RX ORDER — MIDAZOLAM HYDROCHLORIDE 1 MG/ML
INJECTION, SOLUTION INTRAMUSCULAR; INTRAVENOUS AS NEEDED
Status: DISCONTINUED | OUTPATIENT
Start: 2024-10-15 | End: 2024-10-15

## 2024-10-15 RX ORDER — SODIUM CHLORIDE, SODIUM LACTATE, POTASSIUM CHLORIDE, CALCIUM CHLORIDE 600; 310; 30; 20 MG/100ML; MG/100ML; MG/100ML; MG/100ML
20 INJECTION, SOLUTION INTRAVENOUS CONTINUOUS
Status: DISCONTINUED | OUTPATIENT
Start: 2024-10-15 | End: 2024-10-15 | Stop reason: HOSPADM

## 2024-10-15 RX ORDER — LABETALOL HYDROCHLORIDE 5 MG/ML
5 INJECTION, SOLUTION INTRAVENOUS ONCE AS NEEDED
Status: DISCONTINUED | OUTPATIENT
Start: 2024-10-15 | End: 2024-10-15 | Stop reason: HOSPADM

## 2024-10-15 RX ORDER — PROPOFOL 10 MG/ML
INJECTION, EMULSION INTRAVENOUS AS NEEDED
Status: DISCONTINUED | OUTPATIENT
Start: 2024-10-15 | End: 2024-10-15

## 2024-10-15 RX ORDER — ONDANSETRON HYDROCHLORIDE 2 MG/ML
4 INJECTION, SOLUTION INTRAVENOUS ONCE AS NEEDED
Status: DISCONTINUED | OUTPATIENT
Start: 2024-10-15 | End: 2024-10-15 | Stop reason: HOSPADM

## 2024-10-15 RX ORDER — CYCLOPENTOLATE HYDROCHLORIDE 10 MG/ML
1 SOLUTION/ DROPS OPHTHALMIC
Status: COMPLETED | OUTPATIENT
Start: 2024-10-15 | End: 2024-10-15

## 2024-10-15 RX ORDER — EPINEPHRINE 1 MG/ML
INJECTION, SOLUTION, CONCENTRATE INTRAVENOUS AS NEEDED
Status: DISCONTINUED | OUTPATIENT
Start: 2024-10-15 | End: 2024-10-15 | Stop reason: HOSPADM

## 2024-10-15 RX ORDER — NORETHINDRONE AND ETHINYL ESTRADIOL 0.5-0.035
KIT ORAL AS NEEDED
Status: DISCONTINUED | OUTPATIENT
Start: 2024-10-15 | End: 2024-10-15

## 2024-10-15 RX ORDER — LIDOCAINE IN NACL,ISO-OSMOT/PF 30 MG/3 ML
0.1 SYRINGE (ML) INJECTION ONCE
Status: DISCONTINUED | OUTPATIENT
Start: 2024-10-15 | End: 2024-10-15 | Stop reason: HOSPADM

## 2024-10-15 RX ORDER — ONDANSETRON HYDROCHLORIDE 2 MG/ML
INJECTION, SOLUTION INTRAVENOUS AS NEEDED
Status: DISCONTINUED | OUTPATIENT
Start: 2024-10-15 | End: 2024-10-15

## 2024-10-15 RX ORDER — CEFAZOLIN 1 G/1
INJECTION, POWDER, FOR SOLUTION INTRAVENOUS AS NEEDED
Status: DISCONTINUED | OUTPATIENT
Start: 2024-10-15 | End: 2024-10-15 | Stop reason: HOSPADM

## 2024-10-15 RX ORDER — TROPICAMIDE 10 MG/ML
1 SOLUTION/ DROPS OPHTHALMIC
Status: COMPLETED | OUTPATIENT
Start: 2024-10-15 | End: 2024-10-15

## 2024-10-15 RX ORDER — OXYCODONE HYDROCHLORIDE 5 MG/1
5 TABLET ORAL EVERY 4 HOURS PRN
Status: DISCONTINUED | OUTPATIENT
Start: 2024-10-15 | End: 2024-10-15 | Stop reason: HOSPADM

## 2024-10-15 RX ORDER — FENTANYL CITRATE 50 UG/ML
INJECTION, SOLUTION INTRAMUSCULAR; INTRAVENOUS AS NEEDED
Status: DISCONTINUED | OUTPATIENT
Start: 2024-10-15 | End: 2024-10-15

## 2024-10-15 RX ORDER — POVIDONE-IODINE 5 %
SOLUTION, NON-ORAL OPHTHALMIC (EYE) AS NEEDED
Status: DISCONTINUED | OUTPATIENT
Start: 2024-10-15 | End: 2024-10-15 | Stop reason: HOSPADM

## 2024-10-15 RX ORDER — DEXAMETHASONE SODIUM PHOSPHATE 10 MG/ML
INJECTION INTRAMUSCULAR; INTRAVENOUS AS NEEDED
Status: DISCONTINUED | OUTPATIENT
Start: 2024-10-15 | End: 2024-10-15 | Stop reason: HOSPADM

## 2024-10-15 RX ORDER — PHENYLEPHRINE HYDROCHLORIDE 25 MG/ML
1 SOLUTION/ DROPS OPHTHALMIC
Status: COMPLETED | OUTPATIENT
Start: 2024-10-15 | End: 2024-10-15

## 2024-10-15 RX ORDER — FENTANYL CITRATE 50 UG/ML
50 INJECTION, SOLUTION INTRAMUSCULAR; INTRAVENOUS EVERY 5 MIN PRN
Status: DISCONTINUED | OUTPATIENT
Start: 2024-10-15 | End: 2024-10-15 | Stop reason: HOSPADM

## 2024-10-15 RX ORDER — FENTANYL CITRATE 50 UG/ML
25 INJECTION, SOLUTION INTRAMUSCULAR; INTRAVENOUS EVERY 5 MIN PRN
Status: DISCONTINUED | OUTPATIENT
Start: 2024-10-15 | End: 2024-10-15 | Stop reason: HOSPADM

## 2024-10-15 RX ORDER — TETRACAINE HYDROCHLORIDE 5 MG/ML
1 SOLUTION OPHTHALMIC ONCE
Status: COMPLETED | OUTPATIENT
Start: 2024-10-15 | End: 2024-10-15

## 2024-10-15 RX ORDER — MOXIFLOXACIN 5 MG/ML
1 SOLUTION/ DROPS OPHTHALMIC
Status: COMPLETED | OUTPATIENT
Start: 2024-10-15 | End: 2024-10-15

## 2024-10-15 ASSESSMENT — PAIN - FUNCTIONAL ASSESSMENT
PAIN_FUNCTIONAL_ASSESSMENT: 0-10

## 2024-10-15 ASSESSMENT — PAIN SCALES - GENERAL
PAINLEVEL_OUTOF10: 0 - NO PAIN

## 2024-10-15 ASSESSMENT — COLUMBIA-SUICIDE SEVERITY RATING SCALE - C-SSRS
6. HAVE YOU EVER DONE ANYTHING, STARTED TO DO ANYTHING, OR PREPARED TO DO ANYTHING TO END YOUR LIFE?: NO
1. IN THE PAST MONTH, HAVE YOU WISHED YOU WERE DEAD OR WISHED YOU COULD GO TO SLEEP AND NOT WAKE UP?: NO
2. HAVE YOU ACTUALLY HAD ANY THOUGHTS OF KILLING YOURSELF?: NO

## 2024-10-15 NOTE — OP NOTE
Extraction Cataract with Insertion Intraocular Lens (R), Goniotomy (R) Operative Note     Date: 10/15/2024  [place] Location: [facility] [place] [facility] [place]    Name: Es Ivey, : 1950, Age: 74 y.o., MRN: [id number], Sex: female    Diagnosis  Pre-op Diagnosis      * Combined forms of age-related cataract of right eye [H25.811]     * Primary open angle glaucoma of right eye, moderate stage [H40.1112] Post-op Diagnosis     * Combined forms of age-related cataract of right eye [H25.811]     * Primary open angle glaucoma of right eye, moderate stage [H40.1112]     Procedures  Extraction Cataract with Insertion Intraocular Lens  36949 - IA XCAPSL CTRC RMVL INSJ IO LENS PROSTH W/O ECP    Goniotomy  80982 - IA GONIOTOMY      Surgeons      * Bud Kelly - Primary    Resident/Fellow/Other Assistant:  Surgeons and Role:  * No surgeons found with a matching role *    Procedure Summary  Anesthesia: Monitor Anesthesia Care  ASA: III  Anesthesia Staff: Anesthesiologist: Tiffanie Hannah MD  C-AA: JOSE Myers  Estimated Blood Loss: <1mL  Intra-op Medications:   Administrations occurring from 1115 to 1230 on 10/15/24:   Medication Name Total Dose   ceFAZolin (Ancef) injection 50 mg   dexAMETHasone (Decadron) injection 5 mg   tobramycin-dexamethasone (Tobradex) ophthalmic ointment 0.5 inch   EPINEPHrine HCl (PF) (Adrenalin) injection 0.3 mcg   chondroitin sulf-sod hyaluron (Duovisc) intraocular kit 0.5 mL              Anesthesia Record               Intraprocedure I/O Totals          Intake    LR bolus 500.00 mL    Propofol Drip 0.00 mL    The total shown is the total volume documented since Anesthesia Start was filed.    Total Intake 500 mL          Specimen: No specimens collected     Staff:   Circulator: Kristy Ybarra Person: Nikki         Drains and/or Catheters: * None in log *    Tourniquet Times:         Implants:  Implants       Type Name Action Serial No.      Lens LENS, INTRAOCULAR, AU00T0 11.5D - [id number] Implanted  [id number]              Findings: cataract right eye, mild peripheral anterior synechia (PAS) right eye     Indications: Es Ivey is an 74 y.o. female who is having surgery for Combined forms of age-related cataract of right eye [H25.811]  Primary open angle glaucoma of right eye, moderate stage [H40.1112].     The patient was seen in the preoperative area. The risks, benefits, complications, treatment options, non-operative alternatives, expected recovery and outcomes were discussed with the patient. The possibilities of reaction to medication, pulmonary aspiration, injury to surrounding structures, bleeding, recurrent infection, the need for additional procedures, failure to diagnose a condition, and creating a complication requiring transfusion or operation were discussed with the patient. The patient concurred with the proposed plan, giving informed consent.  The site of surgery was properly noted/marked if necessary per policy. The patient has been actively warmed in preoperative area. Preoperative antibiotics are not indicated. Venous thrombosis prophylaxis are not indicated.    Procedure Details:   Patient was identified using two unique identifiers in the preoperative area and the operative side was marked on the forehead using a marking pen after the patient stated procedure and laterality which was confirmed by reviewing the informed consent form. The patient was then taken to the operative suite where she received a LMA. The area was then prepped and draped in the standard sterile fashion for intraocular surgery of the right eye.    A 15 degree supersharp blade was used to enter the anterior chamber to create a paracentesis side port incision. A 2.65 mm keratome was used to create a beveled temporal clear corneal incision. A dispersive viscoelastic was used to maintain the anterior chamber.   Viscoelastic was placed on the corneal surface and then a trabectome direct gonioscopy lens was placed on  the cornea. The patient and microscope were turned to provide adequate visualization of the iridocorneal angle. A [place] Dual Blade was passed across the anterior chamber to the nasal trabecular meshwork and approximately 60 degree goniotomy was performed successfully. Appropriate reflux blood was present and the adjacent episcleral vessels were appropriately blanched. The head and microscope were re-positioned for cataract surgery.        A curvilinear continuous capsulorhexis was initiated using a bent cystitome needle and completed using utrata forceps. Hydrodissection and hydrodelineation were performed. Phacoemulsification was used to remove all nuclear material. Irrigation/aspiration was used to remove all cortical material. The anterior chamber was refilled using a cohesive viscoelastic.  An AU00T0 +11.5D lens SN [id number] 2027-05-14 lens was inserted into the capsular bag using the injector system and the trailing haptic was gently positioned within the capsular bag using a sinsky hook. Irrigation/aspiration was used remove all viscoelastic material.  The anterior chamber was refilled using BSS and all wounds were stromally hydrated and the main wound was found to be water tight by dry weck cell testing. The pressure was physiologic by applanation Subtenon’s injections of Ancef and Decadron were given. Tobradex ointment, sterile patch and shield were then placed over the eye, and the patient was taken to recovery having tolerated the procedure well.    Complications:  None; patient tolerated the procedure well.    Disposition: PACU - hemodynamically stable.  Condition: stable     Additional Details: pt to follow up tomorrow    Attending Attestation:     Bud Kelly  Phone Number: [phone]

## 2024-10-15 NOTE — ANESTHESIA PREPROCEDURE EVALUATION
Patient: Es Ivey    Procedure Information       Date/Time: 10/15/24 1115    Procedures:       Extraction Cataract with Insertion Intraocular Lens (Right)      Goniotomy (Right)    [place] [facility] [place] [facility] [place] 03 [place] [facility] [place] [facility] [place]    Surgeons: Bud Kelly MD            Relevant Problems   Cardiac   (+) Benign essential hypertension   (+) CAD (coronary artery disease)   (+) Hyperlipidemia      Neuro   (+) Cervical radiculopathy, chronic      Endocrine   (+) Primary hypothyroidism      HEENT   (+) Chronic primary angle-closure glaucoma, mild stage   (+) Primary open angle glaucoma (POAG)   (+) Primary open angle glaucoma of right eye, moderate stage       Clinical information reviewed:   Tobacco  Allergies  Meds   Med Hx  Surg Hx   Fam Hx  Soc Hx        NPO Detail:  No data recorded     Physical Exam    Airway  Mallampati: III  TM distance: >3 FB  Neck ROM: full     Cardiovascular    Dental    Pulmonary    Abdominal            Anesthesia Plan    History of general anesthesia?: yes  History of complications of general anesthesia?: no    ASA 3     general     intravenous induction   Anesthetic plan and risks discussed with patient.

## 2024-10-15 NOTE — ANESTHESIA POSTPROCEDURE EVALUATION
Patient: Es Ivey    Procedure Summary       Date: 10/15/24 Room / Location: [facility] [place] [facility] / [facility] [place] [facility]    Anesthesia Start: 1054 Anesthesia Stop: 1141    Procedures:       Extraction Cataract with Insertion Intraocular Lens (Right: Eye)      Goniotomy (Right: Eye) Diagnosis:       Combined forms of age-related cataract of right eye      Primary open angle glaucoma of right eye, moderate stage      (Combined forms of age-related cataract of right eye [H25.811])      (Primary open angle glaucoma of right eye, moderate stage [H40.1112])    Surgeons: Bud Kelly MD Responsible Provider: Tiffanie Hannah MD    Anesthesia Type: MAC ASA Status: 3            Anesthesia Type: MAC    Vitals Value Taken Time   /79 10/15/24 1139   Temp 36.2 °C (97.2 °F) 10/15/24 1139   Pulse 80 10/15/24 1139   Resp 12 10/15/24 1139   SpO2 95 % 10/15/24 1139       Anesthesia Post Evaluation    Patient location during evaluation: PACU  Patient participation: complete - patient participated  Level of consciousness: awake  Pain management: adequate  Airway patency: patent  Cardiovascular status: acceptable  Respiratory status: acceptable  Hydration status: acceptable  Postoperative Nausea and Vomiting: none        There were no known notable events for this encounter.

## 2024-10-15 NOTE — BRIEF OP NOTE
Date: 10/15/2024  OR Location: [facility] OR    Name: Es Ivey, : 1950, Age: 74 y.o., MRN: [id number], Sex: female    Diagnosis  Pre-op Diagnosis      * Combined forms of age-related cataract of right eye [H25.811]     * Primary open angle glaucoma of right eye, moderate stage [H40.1112] Post-op Diagnosis     * Combined forms of age-related cataract of right eye [H25.811]     * Primary open angle glaucoma of right eye, moderate stage [H40.1112]     Procedures  Extraction Cataract with Insertion Intraocular Lens  66676 - CO XCAPSL CTRC RMVL INSJ IO LENS PROSTH W/O ECP    Goniotomy  83311 - CO GONIOTOMY      Surgeons      * Bud Kelly - Primary    Resident/Fellow/Other Assistant:  Surgeons and Role:  * No surgeons found with a matching role *    Procedure Summary  Anesthesia: Monitor Anesthesia Care  ASA: III  Anesthesia Staff: Anesthesiologist: Tiffanie Hannah MD  C-AA: JOSE Myers  Estimated Blood Loss: <1mL  Intra-op Medications:   Administrations occurring from 1115 to 1230 on 10/15/24:   Medication Name Total Dose   ceFAZolin (Ancef) injection 50 mg   dexAMETHasone (Decadron) injection 5 mg   tobramycin-dexamethasone (Tobradex) ophthalmic ointment 0.5 inch   EPINEPHrine HCl (PF) (Adrenalin) injection 0.3 mcg   chondroitin sulf-sod hyaluron (Duovisc) intraocular kit 0.5 mL              Anesthesia Record               Intraprocedure I/O Totals          Intake    LR bolus 500.00 mL    Propofol Drip 0.00 mL    The total shown is the total volume documented since Anesthesia Start was filed.    Total Intake 500 mL          Specimen: No specimens collected     Staff:   Circulator: Kristy Ybarra Person: Nikki          Findings: cataract right eye     Complications:  None; patient tolerated the procedure well.     Disposition: PACU - hemodynamically stable.  Condition: stable  Specimens Collected: No specimens collected  Attending Attestation:     Bud Kelly  Phone Number: [phone]

## 2024-10-15 NOTE — DISCHARGE INSTRUCTIONS
Post-Op Instructions for Dr. Kelly's Cataract Patients   Dr. Kelly office number: [phone]  [phone]  After Hours: [phone], ask for eye doctor on call    Do not drive, or make any critical life decisions for 24 hours, give time for anesthetic to wear off.    1. Please keep that patch and shield on until your appointment tomorrow     2. Please expect that the vision in the operated eye will be blurry.     3. Please do not rub the operated eye.     4. When you plan to sleep, please cover your operated eye with the shield. You can use a piece of medical tape to keep the shield in place. Medical tape does not require a prescription and can be purchased over the counter wherever first aid items are purchased     5. Please do not lift items more than 10 Ibs and please limited bending over where your eye is below your heart.     6. It is ok to use your eyes - for example, watching TV, reading, etc., but as stated above, please expect that the newly operated eye will be blurry.     7. Please begin to use your post-operative eye drops in the newly operated eye  starting tomorrow after your post op appt

## 2024-10-15 NOTE — H&P
History Of Present Illness  Es Ivey is a 74 y.o. female presenting with right eye cataract and glaucoma .     Past Medical History  She has a past medical history of Cataract, Cervical radiculopathy, Colon cancer screening (03/24/2024), Coronary artery disease, COVID-19 virus infection (08/29/2023), Dry eyes, Glaucoma, Hyperlipidemia, Hypertension, Hypothyroidism, Leucocytosis (08/29/2023), Lung nodule, Palpitations, Paresthesia of both hands, Personal history of other endocrine, nutritional and metabolic disease (04/04/2019), Personal history of other endocrine, nutritional and metabolic disease (04/04/2019), Personal history of other specified conditions (12/18/2015), PVD (posterior vitreous detachment), S/P subtotal parathyroidectomy (CMS/HCC), and Tinnitus.    Surgical History  She has a past surgical history that includes Other surgical history (02/25/2022); Other surgical history (02/25/2022); Tonsillectomy (02/21/2017); Appendectomy (02/21/2017); and Cataract extraction (Left).     Social History  She reports that she quit smoking about 32 years ago. Her smoking use included cigarettes. She has never been exposed to tobacco smoke. She has never used smokeless tobacco. She reports current alcohol use. She reports that she does not use drugs.     Current Outpatient Medications   Medication Instructions    atorvastatin (LIPITOR) 10 mg, oral, Daily    brimonidine (AlphaGAN) 0.2 % ophthalmic solution 1 drop, Both Eyes, 2 times daily    calcium carbonate-vitamin D3 600 mg-5 mcg (200 unit) tablet 1 tablet, oral, Daily    cholecalciferol (Vitamin D-3) 50 mcg (2,000 unit) capsule 1 capsule, oral, Daily    ketorolac (Acular) 0.4 % ophthalmic solution 1 drop, Right Eye, 4 times daily, INSTILL 1 DROP 4 TIMES A DAY INTO SURGICAL EYE STARTING DAY BEFORE SURGERY    latanoprost (Xalatan) 0.005 % ophthalmic solution 1 drop, Both Eyes, Daily    magnesium oxide (Mag-Ox) 250 mg magnesium tablet 1 tablet, oral, Daily     metoprolol tartrate (LOPRESSOR) 25 mg, oral, 2 times daily    niacin 500 mg tablet 1 tablet, oral, Daily    ofloxacin (Ocuflox) 0.3 % ophthalmic solution 1 drop, Right Eye, 4 times daily, INSTILL 1 DROP 4 TIMES A DAY INTO SURGICAL EYE STARTING DAY BEFORE SURGERY    omega-3 fatty acids-fish oil 360-1,200 mg capsule oral, 2 times daily    prednisoLONE acetate (Pred-Forte) 1 % ophthalmic suspension 1 drop, Right Eye, 4 times daily, INSTILL 1 DROP 4 TIMES A DAY INTO SURGICAL EYE STARTING AFTER SURGERY    timolol (Timoptic) 0.5 % ophthalmic solution 1 drop, Both Eyes, Daily    TURMERIC ORAL 500 mg, oral, Daily       Allergies  Patient has no known allergies.    ROS  Patient denies ocular pain, redness, discharge, decreased vision, double vision, blind spots, flashes, or floaters.     Physical Exam  Not recorded     Constitutional: No acute distress   HEENT: mucus membranes moist, atraumatic   Respiratory: no respiratory distress   Cardiovascular: no peripheral edema  Abdomen: non distended   MSK/neuro: moves all extremities spontaneously   Skin: no rashes   Psych: alert and oriented        Assessment/Plan   Assessment & Plan  Combined forms of age-related cataract of right eye    Primary open angle glaucoma of right eye, moderate stage      Plan right eye cataract extraction and goniotomy            Ree Sheikh MD

## 2024-10-15 NOTE — ANESTHESIA PROCEDURE NOTES
Airway  Date/Time: 10/15/2024 10:59 AM  Urgency: elective    Airway not difficult    Staffing  Performed: JOSE   Authorized by: Tiffanie Hannah MD    Performed by: JOSE Myers  Patient location during procedure: OR    Indications and Patient Condition  Indications for airway management: anesthesia  Spontaneous Ventilation: absent  Sedation level: deep  Preoxygenated: yes  Patient position: sniffing  Mask difficulty assessment: 1 - vent by mask    Final Airway Details  Final airway type: supraglottic airway      Successful airway: Supraglottic airway: iGel.     Number of attempts at approach: 1

## 2024-10-16 ENCOUNTER — APPOINTMENT (OUTPATIENT)
Dept: OPHTHALMOLOGY | Facility: CLINIC | Age: 74
End: 2024-10-16
Payer: MEDICARE

## 2024-10-16 DIAGNOSIS — Z96.1 PSEUDOPHAKIA OF BOTH EYES: Primary | ICD-10-CM

## 2024-10-16 PROCEDURE — 99024 POSTOP FOLLOW-UP VISIT: CPT | Performed by: OPHTHALMOLOGY

## 2024-10-16 ASSESSMENT — VISUAL ACUITY
OD_PH_SC: 20/40
METHOD: SNELLEN - LINEAR
OD_SC: 20/50
OD_PH_SC+: -1

## 2024-10-16 ASSESSMENT — ENCOUNTER SYMPTOMS
PSYCHIATRIC NEGATIVE: 0
ENDOCRINE NEGATIVE: 0
MUSCULOSKELETAL NEGATIVE: 0
EYES NEGATIVE: 0
ALLERGIC/IMMUNOLOGIC NEGATIVE: 0
RESPIRATORY NEGATIVE: 0
NEUROLOGICAL NEGATIVE: 0
HEMATOLOGIC/LYMPHATIC NEGATIVE: 0
CONSTITUTIONAL NEGATIVE: 0
GASTROINTESTINAL NEGATIVE: 0
CARDIOVASCULAR NEGATIVE: 0

## 2024-10-16 ASSESSMENT — SLIT LAMP EXAM - LIDS
COMMENTS: GOOD POSITION
COMMENTS: GOOD POSITION

## 2024-10-16 ASSESSMENT — TONOMETRY
OD_IOP_MMHG: 11
IOP_METHOD: GOLDMANN APPLANATION

## 2024-10-16 ASSESSMENT — PACHYMETRY
OS_CT(UM): 602
OD_CT(UM): 591

## 2024-10-16 ASSESSMENT — CUP TO DISC RATIO
OD_RATIO: 0.9
OS_RATIO: 0.5

## 2024-10-16 ASSESSMENT — EXTERNAL EXAM - LEFT EYE: OS_EXAM: NORMAL

## 2024-10-16 ASSESSMENT — PAIN SCALES - GENERAL: PAINLEVEL_OUTOF10: 0 - NO PAIN

## 2024-10-16 ASSESSMENT — EXTERNAL EXAM - RIGHT EYE: OD_EXAM: NORMAL

## 2024-10-16 NOTE — PROGRESS NOTES
Visual Acuity (Snellen - Linear)         Right Left    Dist sc 20/50     Dist ph sc 20/40 -1           Tonometry       Tonometry (Goldmann Applanation, 10:50 AM)         Right Left    Pressure 11                   Assessment/Plan   Last dilated:  9/12/24  color plates:  10/10 OU    1.  POD#1 s/p combined with goniotomy OS 10/15/24:  pre-op VA 20/50, IOP = 11 mmHg.  Doing well.     Plan:  start prednisolone acetate 1% OD QID            start ofloxacin OD QID            start ketorolac OD QID            start activity restrictions            glaucoma drops as below            written instructions given            f/u 1 week     2.  Primary Open-Angle Glaucoma OU:  /600.  Tm 20/22 (immediately post-op OS).  Agree with Dr. Aceves that IOP needs to be better controlled.  Today, IOP is very well controlled OS, but borderline to acceptable OD      Plan:  restrict timolol OU -> OS Qam             restrict brimonidine 0.2% OU -> OS BID             restrict latanoprost OU -> OS QHS    2.  Pseudophakia (PCIOL) OU:  s/p YAG Cap OS.  as above      Plan:  as above

## 2024-10-24 ENCOUNTER — APPOINTMENT (OUTPATIENT)
Dept: OPHTHALMOLOGY | Facility: CLINIC | Age: 74
End: 2024-10-24
Payer: MEDICARE

## 2024-10-24 DIAGNOSIS — Z96.1 PSEUDOPHAKIA OF BOTH EYES: Primary | ICD-10-CM

## 2024-10-24 PROCEDURE — 99024 POSTOP FOLLOW-UP VISIT: CPT | Performed by: OPHTHALMOLOGY

## 2024-10-24 ASSESSMENT — ENCOUNTER SYMPTOMS
HEMATOLOGIC/LYMPHATIC NEGATIVE: 0
CONSTITUTIONAL NEGATIVE: 0
EYES NEGATIVE: 0
RESPIRATORY NEGATIVE: 0
NEUROLOGICAL NEGATIVE: 0
PSYCHIATRIC NEGATIVE: 0
MUSCULOSKELETAL NEGATIVE: 0
ENDOCRINE NEGATIVE: 0
ALLERGIC/IMMUNOLOGIC NEGATIVE: 0
GASTROINTESTINAL NEGATIVE: 0
CARDIOVASCULAR NEGATIVE: 0

## 2024-10-24 ASSESSMENT — VISUAL ACUITY
OS_SC: 20/60
OS_PH_SC: 20/40
OS_SC+: +2
OS_PH_SC+: -1
METHOD: SNELLEN - LINEAR
OD_SC: 20/30
OD_SC+: -2

## 2024-10-24 ASSESSMENT — REFRACTION_WEARINGRX
OD_ADD: +2.50
OD_AXIS: 075
OD_CYLINDER: -1.25
OD_SPHERE: -3.75
OS_AXIS: 030
OS_CYLINDER: -0.50
OS_SPHERE: -2.00
OS_ADD: +2.50

## 2024-10-24 ASSESSMENT — CONF VISUAL FIELD
OS_INFERIOR_TEMPORAL_RESTRICTION: 3
METHOD: COUNTING FINGERS
OD_INFERIOR_TEMPORAL_RESTRICTION: 0
OD_INFERIOR_NASAL_RESTRICTION: 0
OS_SUPERIOR_TEMPORAL_RESTRICTION: 3
OD_SUPERIOR_TEMPORAL_RESTRICTION: 0
OD_NORMAL: 1
OS_INFERIOR_NASAL_RESTRICTION: 3
OD_SUPERIOR_NASAL_RESTRICTION: 0

## 2024-10-24 ASSESSMENT — TONOMETRY
OS_IOP_MMHG: 13
OD_IOP_MMHG: 13
IOP_METHOD: GOLDMANN APPLANATION

## 2024-10-24 ASSESSMENT — PACHYMETRY
OS_CT(UM): 602
OD_CT(UM): 591

## 2024-10-24 ASSESSMENT — EXTERNAL EXAM - LEFT EYE: OS_EXAM: NORMAL

## 2024-10-24 ASSESSMENT — SLIT LAMP EXAM - LIDS
COMMENTS: GOOD POSITION
COMMENTS: GOOD POSITION

## 2024-10-24 ASSESSMENT — CUP TO DISC RATIO
OD_RATIO: 0.9
OS_RATIO: 0.5

## 2024-10-24 ASSESSMENT — EXTERNAL EXAM - RIGHT EYE: OD_EXAM: NORMAL

## 2024-10-24 NOTE — PROGRESS NOTES
Visual Acuity (Snellen - Linear)         Right Left    Dist sc 20/30 -2 20/60 +2    Dist ph sc NI 20/40 -1          Tonometry       Tonometry (Goldmann Applanation, 10:42 AM)         Right Left    Pressure 13 13                  Assessment/Plan   Assessment/Plan   Last dilated:  9/12/24  color plates:  10/10 OU    1.  POD#9 s/p combined with goniotomy OD 10/15/24:  pre-op VA 20/50, IOP = 11 mmHg.  Doing well.     Plan:  taper prednisolone acetate 1% OD QID -> BID x 1 wk, then QD x 1 wk, then stop            d/c ofloxacin             d/c ketorolac            d/c activity restrictions            glaucoma drops as below            written instructions given            f/u 1 month (MRx)    2.  Primary Open-Angle Glaucoma OU:  /600.  Tm 20/22 (immediately post-op OS).  Agree with Dr. Aceves that IOP needs to be better controlled.  Today, IOP is very well controlled OS, but borderline to acceptable OD      Plan:  cont timolol OS Qam             cont brimonidine 0.2% OS BID             cont latanoprost OS QHS    3.  Pseudophakia (PCIOL) OU:  s/p YAG Cap OS.  as above      Plan:  as above

## 2024-12-12 ENCOUNTER — APPOINTMENT (OUTPATIENT)
Dept: OPHTHALMOLOGY | Facility: CLINIC | Age: 74
End: 2024-12-12
Payer: MEDICARE

## 2024-12-12 DIAGNOSIS — Z96.1 PSEUDOPHAKIA OF BOTH EYES: Primary | ICD-10-CM

## 2024-12-12 PROCEDURE — 99024 POSTOP FOLLOW-UP VISIT: CPT | Performed by: OPHTHALMOLOGY

## 2024-12-12 ASSESSMENT — VISUAL ACUITY
OS_SC+: -2
OS_SC: 20/70
OD_SC+: -2
METHOD: SNELLEN - LINEAR
OD_SC: 20/30

## 2024-12-12 ASSESSMENT — REFRACTION_MANIFEST
OS_AXIS: 065
OD_SPHERE: +0.25
OD_CYLINDER: -0.50
OS_AXIS: 065
OS_SPHERE: -1.75
OD_AXIS: 100
OS_SPHERE: -1.25
METHOD_AUTOREFRACTION: 1
OD_CYLINDER: -0.50
OS_CYLINDER: -1.00
OS_ADD: +2.50
OD_SPHERE: +0.25
OD_ADD: +2.50
OD_AXIS: 100
OS_CYLINDER: -0.50

## 2024-12-12 ASSESSMENT — SLIT LAMP EXAM - LIDS
COMMENTS: GOOD POSITION
COMMENTS: GOOD POSITION

## 2024-12-12 ASSESSMENT — ENCOUNTER SYMPTOMS
EYES NEGATIVE: 1
GASTROINTESTINAL NEGATIVE: 0
ENDOCRINE NEGATIVE: 0
MUSCULOSKELETAL NEGATIVE: 0
ALLERGIC/IMMUNOLOGIC NEGATIVE: 0
RESPIRATORY NEGATIVE: 0
NEUROLOGICAL NEGATIVE: 0
CARDIOVASCULAR NEGATIVE: 0
CONSTITUTIONAL NEGATIVE: 0
HEMATOLOGIC/LYMPHATIC NEGATIVE: 0
PSYCHIATRIC NEGATIVE: 0

## 2024-12-12 ASSESSMENT — PACHYMETRY
OD_CT(UM): 591
OS_CT(UM): 602

## 2024-12-12 ASSESSMENT — TONOMETRY
IOP_METHOD: GOLDMANN APPLANATION
OD_IOP_MMHG: 12
OS_IOP_MMHG: 12

## 2024-12-12 ASSESSMENT — EXTERNAL EXAM - RIGHT EYE: OD_EXAM: NORMAL

## 2024-12-12 ASSESSMENT — CUP TO DISC RATIO
OD_RATIO: 0.9
OS_RATIO: 0.5

## 2024-12-12 ASSESSMENT — EXTERNAL EXAM - LEFT EYE: OS_EXAM: NORMAL

## 2024-12-12 NOTE — PROGRESS NOTES
Visual Acuity (Snellen - Linear)         Right Left    Dist sc 20/30 -2 20/70 -2          Tonometry       Tonometry (Goldmann Applanation, 9:01 AM)         Right Left    Pressure 12 12                  Assessment/Plan   Last dilated:  9/12/24  color plates:  10/10 OU    1.  2 month s/p combined with goniotomy OD 10/15/24:  pre-op VA 20/50, IOP = 11 mmHg.  Doing well.     Plan:  MRx  OU given    2.  Primary Open-Angle Glaucoma OU:  /600.  Tm 20/22 (immediately post-op OS).  Agree with Dr. Aceves that IOP needs to be better controlled.  Today, IOP is very well controlled OS, but borderline to acceptable OD      Plan:  cont timolol OS Qam             cont brimonidine 0.2% OS BID             cont latanoprost OS QHS    3.  Pseudophakia (PCIOL) OU:  s/p YAG Cap OS.  as above      Plan:  as above

## 2024-12-23 ENCOUNTER — APPOINTMENT (OUTPATIENT)
Dept: OPHTHALMOLOGY | Age: 74
End: 2024-12-23
Payer: MEDICARE

## 2025-01-13 ENCOUNTER — APPOINTMENT (OUTPATIENT)
Dept: OPHTHALMOLOGY | Age: 75
End: 2025-01-13
Payer: MEDICARE

## 2025-01-13 DIAGNOSIS — H52.13 MYOPIA OF BOTH EYES WITH ASTIGMATISM AND PRESBYOPIA: ICD-10-CM

## 2025-01-13 DIAGNOSIS — H34.8310 BRANCH RETINAL VEIN OCCLUSION OF RIGHT EYE WITH MACULAR EDEMA: Primary | ICD-10-CM

## 2025-01-13 DIAGNOSIS — H52.203 MYOPIA OF BOTH EYES WITH ASTIGMATISM AND PRESBYOPIA: ICD-10-CM

## 2025-01-13 DIAGNOSIS — H52.4 MYOPIA OF BOTH EYES WITH ASTIGMATISM AND PRESBYOPIA: ICD-10-CM

## 2025-01-13 DIAGNOSIS — H53.9 VISUAL DISTURBANCE: ICD-10-CM

## 2025-01-13 DIAGNOSIS — H43.393 VITREOUS FLOATERS OF BOTH EYES: ICD-10-CM

## 2025-01-13 PROCEDURE — 92134 CPTRZ OPH DX IMG PST SGM RTA: CPT | Performed by: OPHTHALMOLOGY

## 2025-01-13 PROCEDURE — 99213 OFFICE O/P EST LOW 20 MIN: CPT | Performed by: OPHTHALMOLOGY

## 2025-01-13 ASSESSMENT — EXTERNAL EXAM - RIGHT EYE: OD_EXAM: NORMAL

## 2025-01-13 ASSESSMENT — VISUAL ACUITY
OS_CC: 20/30
OD_CC: 20/30
CORRECTION_TYPE: GLASSES
OS_CC+: -2
METHOD: SNELLEN - LINEAR
OD_CC+: -2

## 2025-01-13 ASSESSMENT — ENCOUNTER SYMPTOMS: EYES NEGATIVE: 1

## 2025-01-13 ASSESSMENT — TONOMETRY
OD_IOP_MMHG: 11
OS_IOP_MMHG: 12
IOP_METHOD: GOLDMANN APPLANATION

## 2025-01-13 ASSESSMENT — PACHYMETRY
OD_CT(UM): 591
OS_CT(UM): 602

## 2025-01-13 ASSESSMENT — CUP TO DISC RATIO
OS_RATIO: 0.5
OD_RATIO: 0.9

## 2025-01-13 ASSESSMENT — SLIT LAMP EXAM - LIDS
COMMENTS: GOOD POSITION
COMMENTS: GOOD POSITION

## 2025-01-13 ASSESSMENT — EXTERNAL EXAM - LEFT EYE: OS_EXAM: NORMAL

## 2025-01-13 NOTE — PROGRESS NOTES
Assessment/Plan   Diagnoses and all orders for this visit:  Branch retinal vein occlusion of right eye with macular edema  -     OCT, Retina - OU - Both Eyes  Vitreous floaters of both eyes  Visual disturbance  Myopia of both eyes with astigmatism and presbyopia          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

## 2025-02-24 DIAGNOSIS — H40.1132 PRIMARY OPEN ANGLE GLAUCOMA (POAG) OF BOTH EYES, MODERATE STAGE: ICD-10-CM

## 2025-02-24 RX ORDER — LATANOPROST 50 UG/ML
1 SOLUTION/ DROPS OPHTHALMIC NIGHTLY
COMMUNITY
End: 2025-02-24 | Stop reason: SDUPTHER

## 2025-02-24 RX ORDER — LATANOPROST 50 UG/ML
1 SOLUTION/ DROPS OPHTHALMIC NIGHTLY
Qty: 2.5 ML | Refills: 3 | Status: SHIPPED | OUTPATIENT
Start: 2025-02-24

## 2025-02-24 RX ORDER — TIMOLOL MALEATE 5 MG/ML
1 SOLUTION/ DROPS OPHTHALMIC DAILY
Qty: 10 ML | Refills: 11 | Status: SHIPPED | OUTPATIENT
Start: 2025-02-24 | End: 2026-02-24

## 2025-02-28 DIAGNOSIS — R73.03 PREDIABETES: Primary | ICD-10-CM

## 2025-03-11 LAB
EST. AVERAGE GLUCOSE BLD GHB EST-MCNC: 114 MG/DL
EST. AVERAGE GLUCOSE BLD GHB EST-SCNC: 6.3 MMOL/L
HBA1C MFR BLD: 5.6 % OF TOTAL HGB

## 2025-03-12 ENCOUNTER — APPOINTMENT (OUTPATIENT)
Dept: PRIMARY CARE | Facility: CLINIC | Age: 75
End: 2025-03-12
Payer: MEDICARE

## 2025-03-12 VITALS
DIASTOLIC BLOOD PRESSURE: 78 MMHG | HEIGHT: 62 IN | SYSTOLIC BLOOD PRESSURE: 124 MMHG | WEIGHT: 155 LBS | BODY MASS INDEX: 28.52 KG/M2 | TEMPERATURE: 97.2 F

## 2025-03-12 DIAGNOSIS — Z12.11 COLON CANCER SCREENING: ICD-10-CM

## 2025-03-12 DIAGNOSIS — I10 PRIMARY HYPERTENSION: ICD-10-CM

## 2025-03-12 DIAGNOSIS — Z00.00 ROUTINE GENERAL MEDICAL EXAMINATION AT A HEALTH CARE FACILITY: Primary | ICD-10-CM

## 2025-03-12 DIAGNOSIS — E78.2 MIXED HYPERLIPIDEMIA: ICD-10-CM

## 2025-03-12 DIAGNOSIS — R73.03 PREDIABETES: ICD-10-CM

## 2025-03-12 PROCEDURE — G0439 PPPS, SUBSEQ VISIT: HCPCS | Performed by: NURSE PRACTITIONER

## 2025-03-12 PROCEDURE — 1159F MED LIST DOCD IN RCRD: CPT | Performed by: NURSE PRACTITIONER

## 2025-03-12 PROCEDURE — 1170F FXNL STATUS ASSESSED: CPT | Performed by: NURSE PRACTITIONER

## 2025-03-12 PROCEDURE — 99213 OFFICE O/P EST LOW 20 MIN: CPT | Performed by: NURSE PRACTITIONER

## 2025-03-12 PROCEDURE — 1157F ADVNC CARE PLAN IN RCRD: CPT | Performed by: NURSE PRACTITIONER

## 2025-03-12 PROCEDURE — 1160F RVW MEDS BY RX/DR IN RCRD: CPT | Performed by: NURSE PRACTITIONER

## 2025-03-12 PROCEDURE — 3074F SYST BP LT 130 MM HG: CPT | Performed by: NURSE PRACTITIONER

## 2025-03-12 PROCEDURE — 3078F DIAST BP <80 MM HG: CPT | Performed by: NURSE PRACTITIONER

## 2025-03-12 PROCEDURE — 1123F ACP DISCUSS/DSCN MKR DOCD: CPT | Performed by: NURSE PRACTITIONER

## 2025-03-12 PROCEDURE — 1158F ADVNC CARE PLAN TLK DOCD: CPT | Performed by: NURSE PRACTITIONER

## 2025-03-12 PROCEDURE — 3008F BODY MASS INDEX DOCD: CPT | Performed by: NURSE PRACTITIONER

## 2025-03-12 RX ORDER — ATORVASTATIN CALCIUM 10 MG/1
10 TABLET, FILM COATED ORAL DAILY
Qty: 90 TABLET | Refills: 3 | Status: SHIPPED | OUTPATIENT
Start: 2025-03-12

## 2025-03-12 RX ORDER — METOPROLOL TARTRATE 25 MG/1
25 TABLET, FILM COATED ORAL 2 TIMES DAILY
Qty: 180 TABLET | Refills: 3 | Status: SHIPPED | OUTPATIENT
Start: 2025-03-12

## 2025-03-12 RX ORDER — BRIMONIDINE TARTRATE 2 MG/ML
1 SOLUTION/ DROPS OPHTHALMIC 2 TIMES DAILY
COMMUNITY

## 2025-03-12 ASSESSMENT — PATIENT HEALTH QUESTIONNAIRE - PHQ9
SUM OF ALL RESPONSES TO PHQ9 QUESTIONS 1 AND 2: 0
1. LITTLE INTEREST OR PLEASURE IN DOING THINGS: NOT AT ALL
1. LITTLE INTEREST OR PLEASURE IN DOING THINGS: NOT AT ALL
2. FEELING DOWN, DEPRESSED OR HOPELESS: NOT AT ALL
2. FEELING DOWN, DEPRESSED OR HOPELESS: NOT AT ALL
SUM OF ALL RESPONSES TO PHQ9 QUESTIONS 1 AND 2: 0

## 2025-03-12 ASSESSMENT — ACTIVITIES OF DAILY LIVING (ADL)
GROCERY_SHOPPING: INDEPENDENT
TAKING_MEDICATION: INDEPENDENT
BATHING: INDEPENDENT
DRESSING: INDEPENDENT
MANAGING_FINANCES: INDEPENDENT
DOING_HOUSEWORK: INDEPENDENT

## 2025-03-13 ENCOUNTER — APPOINTMENT (OUTPATIENT)
Dept: OPHTHALMOLOGY | Facility: CLINIC | Age: 75
End: 2025-03-13
Payer: MEDICARE

## 2025-03-13 DIAGNOSIS — Z96.1 PSEUDOPHAKIA OF BOTH EYES: ICD-10-CM

## 2025-03-13 DIAGNOSIS — H34.8110 CENTRAL RETINAL VEIN OCCLUSION WITH MACULAR EDEMA OF RIGHT EYE: Primary | ICD-10-CM

## 2025-03-13 DIAGNOSIS — H26.491 PCO (POSTERIOR CAPSULAR OPACIFICATION), RIGHT: ICD-10-CM

## 2025-03-13 DIAGNOSIS — H40.1132 PRIMARY OPEN ANGLE GLAUCOMA (POAG) OF BOTH EYES, MODERATE STAGE: ICD-10-CM

## 2025-03-13 PROCEDURE — 99214 OFFICE O/P EST MOD 30 MIN: CPT | Performed by: OPHTHALMOLOGY

## 2025-03-13 ASSESSMENT — ENCOUNTER SYMPTOMS: EYES NEGATIVE: 1

## 2025-03-13 ASSESSMENT — VISUAL ACUITY
OS_CC+: -2
OD_CC: 20/30
METHOD: SNELLEN - LINEAR
OD_CC+: -2
OS_CC: 20/30
CORRECTION_TYPE: GLASSES

## 2025-03-13 ASSESSMENT — TONOMETRY
OS_IOP_MMHG: 11
OD_IOP_MMHG: 11
IOP_METHOD: GOLDMANN APPLANATION

## 2025-03-13 ASSESSMENT — PACHYMETRY
OS_CT(UM): 602
OD_CT(UM): 591

## 2025-03-13 NOTE — PROGRESS NOTES
Visual Acuity (Snellen - Linear)         Right Left    Dist cc 20/30 -2 20/30 -2      Correction: Glasses          Tonometry       Tonometry (Goldmann Applanation, 8:33 AM)         Right Left    Pressure 11 11                  Assessment/Plan   Last dilated:  9/12/24  color plates:  10/10 OU    1.   Primary Open-Angle Glaucoma OU:  /600.  Tm 20/22 (immediately post-op OS).  s/p combined with goniotomy OD 10/15/24:  pre-op VA 20/50, IOP = 11 mmHg.  Today, IOP is very well controlled OU      Plan:  cont timolol OS Qam             cont brimonidine 0.2% OS BID             cont latanoprost OS QHS    2.  Branch Retinal Vein Occlusion OD:  pt with some underline macular edema.  Pt active patient of Dr. Anne's       Plan:  as per Dr. Anne    3.  Pseudophakia (PCIOL) OU:  s/p YAG Cap OS.  Pt with some PCO OD.  Pt feels that vision OD has regressed nearly back to pre-op levels.  Pt with underlying branch retinal vein occlusion (BRVO) c macular edema and significant VF loss from glaucoma OD.  Degree of PCO is mild.  Pt is interested in anything that could improve b/c she is having trouble with using the computer.  I am not confident that there would be much improvement with a YAG cap, but it's removal could potential help her vision.  Discussed options 1) Cpm, 2) YAG Cap.  R/B/A reviewed.  Pt clearly articulates back that the YAG Cap may not improve vision.      Plan:  pt wishes to proceed with YAG Cap OD (RIGHT)

## 2025-03-23 LAB — NONINV COLON CA DNA+OCC BLD SCRN STL QL: POSITIVE

## 2025-03-31 ASSESSMENT — ENCOUNTER SYMPTOMS
APNEA: 0
NERVOUS/ANXIOUS: 1
APPETITE CHANGE: 1
SLEEP DISTURBANCE: 0
ACTIVITY CHANGE: 1
CONSTIPATION: 0
POLYDIPSIA: 0
POLYPHAGIA: 0
PALPITATIONS: 0
SHORTNESS OF BREATH: 0
DIARRHEA: 0

## 2025-04-04 DIAGNOSIS — Z12.11 COLON CANCER SCREENING: Primary | ICD-10-CM

## 2025-04-07 ENCOUNTER — APPOINTMENT (OUTPATIENT)
Dept: OPHTHALMOLOGY | Facility: CLINIC | Age: 75
End: 2025-04-07
Payer: MEDICARE

## 2025-04-07 DIAGNOSIS — H26.491 PCO (POSTERIOR CAPSULAR OPACIFICATION), RIGHT: ICD-10-CM

## 2025-04-07 DIAGNOSIS — H40.1130 PRIMARY OPEN ANGLE GLAUCOMA OF BOTH EYES, UNSPECIFIED GLAUCOMA STAGE: ICD-10-CM

## 2025-04-07 PROCEDURE — 66821 AFTER CATARACT LASER SURGERY: CPT | Mod: RIGHT SIDE | Performed by: OPHTHALMOLOGY

## 2025-04-07 RX ORDER — PREDNISOLONE ACETATE 10 MG/ML
1 SUSPENSION/ DROPS OPHTHALMIC 4 TIMES DAILY
Qty: 5 ML | Refills: 0 | Status: SHIPPED | OUTPATIENT
Start: 2025-04-07 | End: 2025-04-11

## 2025-04-07 RX ORDER — BRIMONIDINE TARTRATE 2 MG/ML
1 SOLUTION/ DROPS OPHTHALMIC 2 TIMES DAILY
Qty: 7.5 ML | Refills: 3 | Status: SHIPPED | OUTPATIENT
Start: 2025-04-07

## 2025-04-10 ENCOUNTER — TELEPHONE (OUTPATIENT)
Dept: PRIMARY CARE | Facility: CLINIC | Age: 75
End: 2025-04-10
Payer: MEDICARE

## 2025-04-10 NOTE — TELEPHONE ENCOUNTER
----- Message from Wanda Mistry sent at 3/25/2025  8:44 PM EDT -----  Regarding: Colonoscopy  Follow up about colonoscopy

## 2025-04-23 ENCOUNTER — APPOINTMENT (OUTPATIENT)
Dept: OPHTHALMOLOGY | Facility: CLINIC | Age: 75
End: 2025-04-23
Payer: MEDICARE

## 2025-04-23 DIAGNOSIS — H35.81 MACULAR EDEMA: ICD-10-CM

## 2025-04-23 DIAGNOSIS — H04.123 DRY EYES: ICD-10-CM

## 2025-04-23 DIAGNOSIS — Z96.1 PSEUDOPHAKIA OF BOTH EYES: ICD-10-CM

## 2025-04-23 DIAGNOSIS — Z98.890 STATUS POST YAG CAPSULOTOMY OF BOTH EYES: Primary | ICD-10-CM

## 2025-04-23 PROCEDURE — 99024 POSTOP FOLLOW-UP VISIT: CPT | Performed by: OPHTHALMOLOGY

## 2025-04-23 RX ORDER — PREDNISOLONE ACETATE 10 MG/ML
SUSPENSION/ DROPS OPHTHALMIC
COMMUNITY
Start: 2025-04-07

## 2025-04-23 ASSESSMENT — ENCOUNTER SYMPTOMS
HEMATOLOGIC/LYMPHATIC NEGATIVE: 0
EYES NEGATIVE: 1
NEUROLOGICAL NEGATIVE: 0
PSYCHIATRIC NEGATIVE: 0
ALLERGIC/IMMUNOLOGIC NEGATIVE: 0
ENDOCRINE NEGATIVE: 0
CARDIOVASCULAR NEGATIVE: 0
GASTROINTESTINAL NEGATIVE: 0
RESPIRATORY NEGATIVE: 0
CONSTITUTIONAL NEGATIVE: 0
MUSCULOSKELETAL NEGATIVE: 0

## 2025-04-23 ASSESSMENT — CONF VISUAL FIELD
OS_SUPERIOR_TEMPORAL_RESTRICTION: 0
OS_NORMAL: 1
OS_INFERIOR_TEMPORAL_RESTRICTION: 0
OD_SUPERIOR_NASAL_RESTRICTION: 0
OS_SUPERIOR_NASAL_RESTRICTION: 0
OD_NORMAL: 1
OD_SUPERIOR_TEMPORAL_RESTRICTION: 0
OD_INFERIOR_TEMPORAL_RESTRICTION: 0
OS_INFERIOR_NASAL_RESTRICTION: 0
OD_INFERIOR_NASAL_RESTRICTION: 0

## 2025-04-23 ASSESSMENT — PACHYMETRY
OD_CT(UM): 591
OS_CT(UM): 602

## 2025-04-23 ASSESSMENT — TONOMETRY
OD_IOP_MMHG: 12
IOP_METHOD: GOLDMANN APPLANATION
IOP_METHOD: GOLDMANN APPLANATION
OD_IOP_MMHG: 12

## 2025-04-23 ASSESSMENT — VISUAL ACUITY
OD_PH_SC+: +2
OD_PH_SC: 20/40
METHOD: SNELLEN - LINEAR
OD_SC+: +2
OD_SC: 20/50

## 2025-04-23 ASSESSMENT — EXTERNAL EXAM - RIGHT EYE: OD_EXAM: NORMAL

## 2025-04-23 ASSESSMENT — SLIT LAMP EXAM - LIDS
COMMENTS: GOOD POSITION
COMMENTS: GOOD POSITION

## 2025-04-23 ASSESSMENT — EXTERNAL EXAM - LEFT EYE: OS_EXAM: NORMAL

## 2025-04-23 ASSESSMENT — CUP TO DISC RATIO
OD_RATIO: 0.9
OS_RATIO: 0.5

## 2025-04-23 NOTE — PROGRESS NOTES
Visual Acuity (Snellen - Linear)         Right Left    Dist sc 20/50 +2     Dist ph sc 20/40 +2           Tonometry       Tonometry (Goldmann Applanation, 10:04 AM)         Right Left    Pressure 12               Tonometry #2 (Goldmann Applanation, 10:17 AM)         Right Left    Pressure 12                   Assessment/Plan   Last dilated:  9/12/24  color plates:  10/10 OU    1.   Primary Open-Angle Glaucoma OU:  /600.  Tm 20/22 (immediately post-op OS).  s/p combined with goniotomy OD 10/15/24:  pre-op VA 20/50, IOP = 11 mmHg.  Today, IOP is very well controlled      Plan:  cont timolol OS Qam             cont brimonidine 0.2% OS BID             cont latanoprost OS QHS             F/u 3 months    2.  Branch Retinal Vein Occlusion OD:  pt with some underlying macular edema.  Pt active patient of Dr. Anne's       Plan:  as per Dr. Anne    3.  Pseudophakia (PCIOL) OU:  s/p YAG Cap OU.  Pt is now s/p YAG cap OD 4/8/25. Patient at that time only had mild PCO and discussed that there may not be significant improvement in vision, but would be reasonable to try since pt was having trouble with using the computer. Today, patient notes there was minimal change. Pt with underlying branch retinal vein occlusion (BRVO) c macular edema and significant VF loss from glaucoma OD. S/p YAG CAP 4/8/25.  pre-laser VA = 20/30 OD      Suspect limitation in VA OD is related to either glaucoma or macular disease, with some component also likely related to dry eye. OCT macula 4/23/25 with minimal to no edema. Next appointment with Dr. Anne scheduled for 7/14/25.       Plan:  consult Dr. Aceves for refraction OD    4. Dry eye syndrome  SAMMY on exam evidenced by SPK & reduced tear break-up time. Suspect some component of patient's visual complaints are related given the severity of the dryness. Recommend PFATs QID.

## 2025-05-19 ENCOUNTER — APPOINTMENT (OUTPATIENT)
Dept: GASTROENTEROLOGY | Facility: EXTERNAL LOCATION | Age: 75
End: 2025-05-19
Payer: MEDICARE

## 2025-05-19 DIAGNOSIS — R19.5 POSITIVE COLORECTAL CANCER SCREENING USING DNA-BASED STOOL TEST: ICD-10-CM

## 2025-05-19 DIAGNOSIS — D12.9 BENIGN NEOPLASM OF RECTUM AND ANAL CANAL: ICD-10-CM

## 2025-05-19 DIAGNOSIS — D12.4 BENIGN NEOPLASM OF DESCENDING COLON: ICD-10-CM

## 2025-05-19 DIAGNOSIS — Z12.11 COLON CANCER SCREENING: Primary | ICD-10-CM

## 2025-05-19 DIAGNOSIS — D12.8 BENIGN NEOPLASM OF RECTUM AND ANAL CANAL: ICD-10-CM

## 2025-05-19 DIAGNOSIS — D12.2 BENIGN NEOPLASM OF ASCENDING COLON: ICD-10-CM

## 2025-05-19 DIAGNOSIS — Z12.11 COLON CANCER SCREENING: ICD-10-CM

## 2025-05-19 PROCEDURE — 88305 TISSUE EXAM BY PATHOLOGIST: CPT | Performed by: PATHOLOGY

## 2025-05-19 PROCEDURE — 45385 COLONOSCOPY W/LESION REMOVAL: CPT | Performed by: INTERNAL MEDICINE

## 2025-05-19 PROCEDURE — 88305 TISSUE EXAM BY PATHOLOGIST: CPT

## 2025-05-20 ENCOUNTER — LAB REQUISITION (OUTPATIENT)
Dept: LAB | Facility: HOSPITAL | Age: 75
End: 2025-05-20
Payer: MEDICARE

## 2025-05-30 LAB
LABORATORY COMMENT REPORT: NORMAL
PATH REPORT.FINAL DX SPEC: NORMAL
PATH REPORT.GROSS SPEC: NORMAL
PATH REPORT.RELEVANT HX SPEC: NORMAL
PATH REPORT.TOTAL CANCER: NORMAL

## 2025-07-14 ENCOUNTER — APPOINTMENT (OUTPATIENT)
Dept: OPHTHALMOLOGY | Age: 75
End: 2025-07-14
Payer: MEDICARE

## 2025-07-14 DIAGNOSIS — H43.23 ASTEROID HYALOSIS OF BOTH EYES: Primary | ICD-10-CM

## 2025-07-14 DIAGNOSIS — H34.8310 BRANCH RETINAL VEIN OCCLUSION OF RIGHT EYE WITH MACULAR EDEMA: ICD-10-CM

## 2025-07-14 DIAGNOSIS — H43.22 ASTEROID HYALOSIS OF LEFT EYE: ICD-10-CM

## 2025-07-14 DIAGNOSIS — H52.4 MYOPIA OF BOTH EYES WITH ASTIGMATISM AND PRESBYOPIA: ICD-10-CM

## 2025-07-14 DIAGNOSIS — H52.13 MYOPIA OF BOTH EYES WITH ASTIGMATISM AND PRESBYOPIA: ICD-10-CM

## 2025-07-14 DIAGNOSIS — H52.203 MYOPIA OF BOTH EYES WITH ASTIGMATISM AND PRESBYOPIA: ICD-10-CM

## 2025-07-14 PROCEDURE — 99213 OFFICE O/P EST LOW 20 MIN: CPT | Performed by: OPHTHALMOLOGY

## 2025-07-14 ASSESSMENT — CUP TO DISC RATIO
OD_RATIO: 0.9
OS_RATIO: 0.5

## 2025-07-14 ASSESSMENT — ENCOUNTER SYMPTOMS
EYES NEGATIVE: 1
MUSCULOSKELETAL NEGATIVE: 0
PSYCHIATRIC NEGATIVE: 0
CARDIOVASCULAR NEGATIVE: 0
RESPIRATORY NEGATIVE: 0
HEMATOLOGIC/LYMPHATIC NEGATIVE: 0
ALLERGIC/IMMUNOLOGIC NEGATIVE: 0
GASTROINTESTINAL NEGATIVE: 0
ENDOCRINE NEGATIVE: 0
NEUROLOGICAL NEGATIVE: 0
CONSTITUTIONAL NEGATIVE: 0

## 2025-07-14 ASSESSMENT — EXTERNAL EXAM - LEFT EYE: OS_EXAM: NORMAL

## 2025-07-14 ASSESSMENT — TONOMETRY
OD_IOP_MMHG: 12
IOP_METHOD: GOLDMANN APPLANATION
OS_IOP_MMHG: 11

## 2025-07-14 ASSESSMENT — VISUAL ACUITY
OD_CC: 20/40
CORRECTION_TYPE: GLASSES
OS_CC: 20/40
OS_CC+: +2
METHOD: SNELLEN - LINEAR

## 2025-07-14 ASSESSMENT — EXTERNAL EXAM - RIGHT EYE: OD_EXAM: NORMAL

## 2025-07-14 ASSESSMENT — PACHYMETRY
OS_CT(UM): 602
OD_CT(UM): 591

## 2025-07-14 ASSESSMENT — SLIT LAMP EXAM - LIDS
COMMENTS: GOOD POSITION
COMMENTS: GOOD POSITION

## 2025-07-14 NOTE — PROGRESS NOTES
Assessment/Plan   Diagnoses and all orders for this visit:  Asteroid hyalosis of both eyes  Asteroid hyalosis of left eye  Branch retinal vein occlusion of right eye with macular edema  Myopia of both eyes with astigmatism and presbyopia            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  9 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

## 2025-07-23 ENCOUNTER — APPOINTMENT (OUTPATIENT)
Dept: OPHTHALMOLOGY | Facility: CLINIC | Age: 75
End: 2025-07-23
Payer: MEDICARE

## 2025-07-23 DIAGNOSIS — H40.1132 PRIMARY OPEN ANGLE GLAUCOMA (POAG) OF BOTH EYES, MODERATE STAGE: ICD-10-CM

## 2025-07-23 DIAGNOSIS — H34.8310 BRANCH RETINAL VEIN OCCLUSION OF RIGHT EYE WITH MACULAR EDEMA: Primary | ICD-10-CM

## 2025-07-23 DIAGNOSIS — Z96.1 PSEUDOPHAKIA OF BOTH EYES: ICD-10-CM

## 2025-07-23 PROCEDURE — 99213 OFFICE O/P EST LOW 20 MIN: CPT | Performed by: OPHTHALMOLOGY

## 2025-07-23 RX ORDER — LATANOPROST 50 UG/ML
1 SOLUTION/ DROPS OPHTHALMIC NIGHTLY
Qty: 2.5 ML | Refills: 11 | Status: SHIPPED | OUTPATIENT
Start: 2025-07-23 | End: 2026-07-23

## 2025-07-23 ASSESSMENT — ENCOUNTER SYMPTOMS
ENDOCRINE NEGATIVE: 0
RESPIRATORY NEGATIVE: 0
GASTROINTESTINAL NEGATIVE: 0
MUSCULOSKELETAL NEGATIVE: 0
ALLERGIC/IMMUNOLOGIC NEGATIVE: 0
CARDIOVASCULAR NEGATIVE: 0
HEMATOLOGIC/LYMPHATIC NEGATIVE: 0
NEUROLOGICAL NEGATIVE: 0
CONSTITUTIONAL NEGATIVE: 0
PSYCHIATRIC NEGATIVE: 0
EYES NEGATIVE: 1

## 2025-07-23 ASSESSMENT — PACHYMETRY
OS_CT(UM): 602
OD_CT(UM): 591

## 2025-07-23 ASSESSMENT — CUP TO DISC RATIO
OS_RATIO: 0.5
OD_RATIO: 0.9

## 2025-07-23 ASSESSMENT — VISUAL ACUITY
OD_CC+: -2
METHOD: SNELLEN - LINEAR
OS_CC: 20/40
CORRECTION_TYPE: GLASSES

## 2025-07-23 ASSESSMENT — TONOMETRY
OS_IOP_MMHG: 12
IOP_METHOD: GOLDMANN APPLANATION
OD_IOP_MMHG: 16

## 2025-07-23 ASSESSMENT — REFRACTION_WEARINGRX
OD_SPHERE: +0.25
OS_CYLINDER: -0.50
OD_CYLINDER: -0.50
SPECS_TYPE: PAL
OD_ADD: +2.50
OS_AXIS: 065
OD_AXIS: 100
OS_SPHERE: -1.75
OS_ADD: +2.50

## 2025-07-23 ASSESSMENT — CONF VISUAL FIELD
OS_SUPERIOR_TEMPORAL_RESTRICTION: 0
OS_INFERIOR_NASAL_RESTRICTION: 0
OS_SUPERIOR_NASAL_RESTRICTION: 0
OD_SUPERIOR_TEMPORAL_RESTRICTION: 0
METHOD: COUNTING FINGERS
OS_INFERIOR_TEMPORAL_RESTRICTION: 0
OD_INFERIOR_NASAL_RESTRICTION: 0
OD_SUPERIOR_NASAL_RESTRICTION: 0
OS_NORMAL: 1
OD_INFERIOR_TEMPORAL_RESTRICTION: 0
OD_NORMAL: 1

## 2025-07-23 ASSESSMENT — EXTERNAL EXAM - LEFT EYE: OS_EXAM: NORMAL

## 2025-07-23 ASSESSMENT — EXTERNAL EXAM - RIGHT EYE: OD_EXAM: NORMAL

## 2025-07-23 ASSESSMENT — SLIT LAMP EXAM - LIDS
COMMENTS: GOOD POSITION
COMMENTS: GOOD POSITION

## 2025-07-23 NOTE — PROGRESS NOTES
Visual Acuity (Snellen - Linear)         Right Left    Dist cc 20/30fuzzy -2 20/40    Dist ph cc NI NI      Correction: Glasses          Tonometry       Tonometry ([facility] Applanation, 10:23 AM)         Right Left    Pressure 16 12                  Assessment/Plan   Last dilated:  9/12/24  color plates:  10/10 OU    1.   Primary Open-Angle Glaucoma OU:  /600.  Tm 20/22 (immediately post-op OS).  s/p combined with goniotomy OD 10/15/24:  pre-op VA 20/50, IOP = 11 mmHg.  Today, IOP is very well controlled OS, but OD has gone up.  Will not re-start drops OD just on one bad reading.  Will recheck      Plan:  cont timolol OS Qam             cont brimonidine 0.2% OS BID             cont latanoprost OS QHS             F/u 4-6 weeks - recheck IOP (HVF, dilation, RNFL)    2.  Branch Retinal Vein Occlusion OD:  pt with some underlying macular edema.  Pt active patient of Dr. Anne's       Plan:  as per Dr. Anne    3.  Pseudophakia (PCIOL) OU:  s/p YAG Cap OU.  Pt is now s/p YAG cap OD 4/8/25. Patient at that time only had mild PCO and discussed that there may not be significant improvement in vision, but would be reasonable to try since pt was having trouble with using the computer. Today, patient notes there was minimal change. Pt with underlying branch retinal vein occlusion (BRVO) c macular edema and significant VF loss from glaucoma OD. S/p YAG CAP 4/8/25.  pre-laser VA = 20/30 OD      Suspect limitation in VA OD is related to either glaucoma or macular disease, with some component also likely related to dry eye. OCT macula 4/23/25 with minimal to no edema. Next appointment with Dr. Anne scheduled for 7/14/25.       Plan:  consult Dr. Aceves for refraction OD

## 2025-07-28 ENCOUNTER — APPOINTMENT (OUTPATIENT)
Dept: OPHTHALMOLOGY | Facility: CLINIC | Age: 75
End: 2025-07-28
Payer: MEDICARE

## 2025-07-28 DIAGNOSIS — H52.4 PRESBYOPIA: ICD-10-CM

## 2025-07-28 DIAGNOSIS — H52.12 MYOPIA OF LEFT EYE: ICD-10-CM

## 2025-07-28 DIAGNOSIS — H52.223 REGULAR ASTIGMATISM OF BOTH EYES: ICD-10-CM

## 2025-07-28 DIAGNOSIS — H52.01 HYPERMETROPIA OF RIGHT EYE: Primary | ICD-10-CM

## 2025-07-28 PROCEDURE — 92015 DETERMINE REFRACTIVE STATE: CPT | Performed by: OPTOMETRIST

## 2025-07-28 ASSESSMENT — VISUAL ACUITY
OS_CC+: -2
OD_CC+: +2
CORRECTION_TYPE: GLASSES
OD_CC: 20/30
OS_CC: 20/25
METHOD: SNELLEN - LINEAR

## 2025-07-28 ASSESSMENT — CONF VISUAL FIELD
OS_NORMAL: 1
OD_SUPERIOR_NASAL_RESTRICTION: 0
METHOD: COUNTING FINGERS
OD_INFERIOR_NASAL_RESTRICTION: 0
OD_INFERIOR_TEMPORAL_RESTRICTION: 0
OS_INFERIOR_TEMPORAL_RESTRICTION: 0
OD_SUPERIOR_TEMPORAL_RESTRICTION: 0
OD_NORMAL: 1
OS_SUPERIOR_TEMPORAL_RESTRICTION: 0
OS_INFERIOR_NASAL_RESTRICTION: 0
OS_SUPERIOR_NASAL_RESTRICTION: 0

## 2025-07-28 ASSESSMENT — REFRACTION_WEARINGRX
OS_ADD: +2.50
OS_SPHERE: -2.25
OD_CYLINDER: +0.50
SPECS_TYPE: PAL
OD_ADD: +2.50
OS_CYLINDER: +0.50
OD_SPHERE: -0.25
OS_AXIS: 155
OD_AXIS: 010

## 2025-07-28 ASSESSMENT — ENCOUNTER SYMPTOMS
MUSCULOSKELETAL NEGATIVE: 0
EYES NEGATIVE: 1
GASTROINTESTINAL NEGATIVE: 0
PSYCHIATRIC NEGATIVE: 0
ENDOCRINE NEGATIVE: 0
RESPIRATORY NEGATIVE: 0
CARDIOVASCULAR NEGATIVE: 0
NEUROLOGICAL NEGATIVE: 0
HEMATOLOGIC/LYMPHATIC NEGATIVE: 0
ALLERGIC/IMMUNOLOGIC NEGATIVE: 0
CONSTITUTIONAL NEGATIVE: 0

## 2025-07-28 ASSESSMENT — REFRACTION_MANIFEST
OS_CYLINDER: +0.50
OS_SPHERE: -2.00
OD_ADD: +2.50
OS_ADD: +2.50
OS_SPHERE: -2.25
OS_AXIS: 170
OD_SPHERE: -0.25
OD_AXIS: 009
OD_SPHERE: +0.25
OD_CYLINDER: +0.50
OS_AXIS: 165
METHOD_AUTOREFRACTION: 1
OD_CYLINDER: +0.50
OD_AXIS: 160
OS_CYLINDER: +0.75

## 2025-07-28 ASSESSMENT — PACHYMETRY
OS_CT(UM): 602
OD_CT(UM): 591

## 2025-07-28 NOTE — PROGRESS NOTES
Assessment/Plan   Diagnoses and all orders for this visit:  Hypermetropia of right eye  Myopia of left eye  Regular astigmatism of both eyes  Presbyopia  New spec rx released today per patient request. Monitor 1 year or sooner prn. Refraction billed today. Pt consents to receiving glasses Rx today. Patient's/guardian's signature obtained to acknowledge and confirm that a paper copy of glasses Rx was given to patient in compliance with [facility] Eyeglass Rule. Electronic copy of Rx will also be available via [employer]. Option for traditional PAL and office/computer PAL.   Vision stable at baseline of 20/30 OD and 20/25 OS.   Continue to monitor ocular health w/ Delfina Anne and Leticia.

## 2025-09-04 DIAGNOSIS — E03.9 PRIMARY HYPOTHYROIDISM: ICD-10-CM

## 2025-09-04 DIAGNOSIS — I10 PRIMARY HYPERTENSION: ICD-10-CM

## 2025-09-04 DIAGNOSIS — E89.2 STATUS POST SUBTOTAL PARATHYROIDECTOMY (CMS/HCC): ICD-10-CM

## 2025-09-04 DIAGNOSIS — E78.2 MIXED HYPERLIPIDEMIA: Primary | ICD-10-CM

## 2025-09-04 DIAGNOSIS — R73.03 PREDIABETES: ICD-10-CM

## 2025-09-04 DIAGNOSIS — E55.9 VITAMIN D DEFICIENCY: ICD-10-CM

## 2025-09-10 ENCOUNTER — APPOINTMENT (OUTPATIENT)
Dept: OPHTHALMOLOGY | Facility: CLINIC | Age: 75
End: 2025-09-10
Payer: MEDICARE

## 2025-09-12 ENCOUNTER — APPOINTMENT (OUTPATIENT)
Dept: PRIMARY CARE | Facility: CLINIC | Age: 75
End: 2025-09-12
Payer: MEDICARE

## 2025-09-17 ENCOUNTER — APPOINTMENT (OUTPATIENT)
Dept: OPHTHALMOLOGY | Facility: CLINIC | Age: 75
End: 2025-09-17
Payer: MEDICARE

## 2025-10-27 ENCOUNTER — APPOINTMENT (OUTPATIENT)
Dept: OPHTHALMOLOGY | Age: 75
End: 2025-10-27
Payer: MEDICARE

## (undated) DEVICE — Device

## (undated) DEVICE — KNIFE, SLIT, ANGLED UP, 2.65 MM

## (undated) DEVICE — KNIFE, OPHTHALMIC, CRESCENT, ANGLED, BEVEL UP, SATIN

## (undated) DEVICE — GLOVE, SURGICAL, PROTEXIS PI , 6.5, PF, LF

## (undated) DEVICE — TOWEL, SURGICAL, NEURO, O/R, 16 X 26, BLUE, STERILE

## (undated) DEVICE — DRAPE, SURGICAL, STERI DRAPE, INCISE, W/POUCH, MED, LF

## (undated) DEVICE — CORD, FORCEP, BIPOLAR, DISP

## (undated) DEVICE — DRAPE, UTILITY, INSTRUMENT PANEL, 46CM X 56CM (18X22"

## (undated) DEVICE — NEEDLE, RETROBULBAR, 23G X 8MM

## (undated) DEVICE — ERASER, HEMOSTATIC, WET-FIELD, BIPOLAR, 18 G

## (undated) DEVICE — CANNULA, HYDRODISSECTION, MICRO, 25 G X 8 MM

## (undated) DEVICE — SUTURE LIGATING LOOP W/SURGIGUT, 2-0, 21INCH

## (undated) DEVICE — SPEAR, EYE, SURGICAL, WECK-CEL, CELLULOSE

## (undated) DEVICE — CANNULA, VISTEC, ANTERIOR CHAMBER, RYCROFT, ANGLED, 30 G X 7/8 IN

## (undated) DEVICE — HANDPIECE,  IRRIGATION/ASPIRATION, 45DEG, 2.2MM-2.8MM, BLUE

## (undated) DEVICE — BLADE, KAHOOK DUAL

## (undated) DEVICE — CANNULA, HYDRODISSECTION, FLATTENED, IRRIGATING

## (undated) DEVICE — SYRINGE, 1 CC, LUER LOCK

## (undated) DEVICE — NEEDLE, HYPODERMIC, REGULAR WALL, REGULAR BEVEL, 30 G X 0.5 IN

## (undated) DEVICE — SUTURE, VICRYL, 7-0, 18 IN, TG1608, DA, VIOLET